# Patient Record
Sex: MALE | Race: WHITE | NOT HISPANIC OR LATINO | Employment: PART TIME | URBAN - NONMETROPOLITAN AREA
[De-identification: names, ages, dates, MRNs, and addresses within clinical notes are randomized per-mention and may not be internally consistent; named-entity substitution may affect disease eponyms.]

---

## 2021-10-24 ENCOUNTER — HOSPITAL ENCOUNTER (EMERGENCY)
Facility: OTHER | Age: 21
Discharge: LEFT BEFORE DISCHARGE | End: 2021-10-24
Attending: PHYSICIAN ASSISTANT | Admitting: PHYSICIAN ASSISTANT
Payer: COMMERCIAL

## 2021-10-24 VITALS
HEART RATE: 131 BPM | RESPIRATION RATE: 16 BRPM | OXYGEN SATURATION: 97 % | DIASTOLIC BLOOD PRESSURE: 87 MMHG | SYSTOLIC BLOOD PRESSURE: 139 MMHG | WEIGHT: 145 LBS | TEMPERATURE: 97.1 F

## 2021-10-24 DIAGNOSIS — R05.9 COUGH: ICD-10-CM

## 2021-10-24 DIAGNOSIS — R50.9 FEVER: ICD-10-CM

## 2021-10-24 DIAGNOSIS — J02.9 SORE THROAT: ICD-10-CM

## 2021-10-24 LAB
ANION GAP SERPL CALCULATED.3IONS-SCNC: 9 MMOL/L (ref 3–14)
BASOPHILS # BLD AUTO: 0 10E3/UL (ref 0–0.2)
BASOPHILS NFR BLD AUTO: 0 %
BUN SERPL-MCNC: 18 MG/DL (ref 7–25)
CALCIUM SERPL-MCNC: 10 MG/DL (ref 8.6–10.3)
CHLORIDE BLD-SCNC: 97 MMOL/L (ref 98–107)
CO2 SERPL-SCNC: 28 MMOL/L (ref 21–31)
CREAT SERPL-MCNC: 1.01 MG/DL (ref 0.7–1.3)
EOSINOPHIL # BLD AUTO: 0.1 10E3/UL (ref 0–0.7)
EOSINOPHIL NFR BLD AUTO: 0 %
ERYTHROCYTE [DISTWIDTH] IN BLOOD BY AUTOMATED COUNT: 12.4 % (ref 10–15)
FLUAV RNA SPEC QL NAA+PROBE: NEGATIVE
FLUBV RNA RESP QL NAA+PROBE: NEGATIVE
GFR SERPL CREATININE-BSD FRML MDRD: >90 ML/MIN/1.73M2
GLUCOSE BLD-MCNC: 100 MG/DL (ref 70–105)
GROUP A STREP BY PCR: NOT DETECTED
HCT VFR BLD AUTO: 43.7 % (ref 40–53)
HGB BLD-MCNC: 15.7 G/DL (ref 13.3–17.7)
HOLD SPECIMEN: NORMAL
IMM GRANULOCYTES # BLD: 0 10E3/UL
IMM GRANULOCYTES NFR BLD: 0 %
LYMPHOCYTES # BLD AUTO: 3.6 10E3/UL (ref 0.8–5.3)
LYMPHOCYTES NFR BLD AUTO: 27 %
MCH RBC QN AUTO: 33.3 PG (ref 26.5–33)
MCHC RBC AUTO-ENTMCNC: 35.9 G/DL (ref 31.5–36.5)
MCV RBC AUTO: 93 FL (ref 78–100)
MONOCYTES # BLD AUTO: 1.2 10E3/UL (ref 0–1.3)
MONOCYTES NFR BLD AUTO: 9 %
MONOCYTES NFR BLD AUTO: NEGATIVE %
NEUTROPHILS # BLD AUTO: 8.4 10E3/UL (ref 1.6–8.3)
NEUTROPHILS NFR BLD AUTO: 64 %
NRBC # BLD AUTO: 0 10E3/UL
NRBC BLD AUTO-RTO: 0 /100
PLATELET # BLD AUTO: 367 10E3/UL (ref 150–450)
POTASSIUM BLD-SCNC: 4.7 MMOL/L (ref 3.5–5.1)
RBC # BLD AUTO: 4.72 10E6/UL (ref 4.4–5.9)
RSV RNA SPEC NAA+PROBE: NEGATIVE
SARS-COV-2 RNA RESP QL NAA+PROBE: NEGATIVE
SODIUM SERPL-SCNC: 134 MMOL/L (ref 134–144)
WBC # BLD AUTO: 13.4 10E3/UL (ref 4–11)

## 2021-10-24 PROCEDURE — 86308 HETEROPHILE ANTIBODY SCREEN: CPT | Performed by: PHYSICIAN ASSISTANT

## 2021-10-24 PROCEDURE — 87651 STREP A DNA AMP PROBE: CPT | Performed by: PHYSICIAN ASSISTANT

## 2021-10-24 PROCEDURE — 87637 SARSCOV2&INF A&B&RSV AMP PRB: CPT | Performed by: PHYSICIAN ASSISTANT

## 2021-10-24 PROCEDURE — C9803 HOPD COVID-19 SPEC COLLECT: HCPCS | Performed by: PHYSICIAN ASSISTANT

## 2021-10-24 PROCEDURE — 99282 EMERGENCY DEPT VISIT SF MDM: CPT | Performed by: PHYSICIAN ASSISTANT

## 2021-10-24 PROCEDURE — 82310 ASSAY OF CALCIUM: CPT | Performed by: PHYSICIAN ASSISTANT

## 2021-10-24 PROCEDURE — 87651 STREP A DNA AMP PROBE: CPT | Performed by: STUDENT IN AN ORGANIZED HEALTH CARE EDUCATION/TRAINING PROGRAM

## 2021-10-24 PROCEDURE — 85025 COMPLETE CBC W/AUTO DIFF WBC: CPT | Performed by: PHYSICIAN ASSISTANT

## 2021-10-24 PROCEDURE — 87637 SARSCOV2&INF A&B&RSV AMP PRB: CPT | Performed by: STUDENT IN AN ORGANIZED HEALTH CARE EDUCATION/TRAINING PROGRAM

## 2021-10-24 PROCEDURE — 36415 COLL VENOUS BLD VENIPUNCTURE: CPT | Performed by: PHYSICIAN ASSISTANT

## 2021-10-24 PROCEDURE — 99283 EMERGENCY DEPT VISIT LOW MDM: CPT | Performed by: PHYSICIAN ASSISTANT

## 2021-10-24 NOTE — ED PROVIDER NOTES
History     Chief Complaint   Patient presents with     Pharyngitis     HPI  Jared Sanchez is a 21 year old male who presents to the ED for evaluation of pharyngitis.  He reports suffering from a sore throat as well as cough and chills over the last couple of days.  He is still able to eat and drink however it is slightly uncomfortable for him.  He is up-to-date on vaccinations other than Covid.  He denies chest pain, shortness of breath, abdominal pain, dysuria.    Allergies:  No Known Allergies    Problem List:    There are no problems to display for this patient.       Past Medical History:    History reviewed. No pertinent past medical history.    Past Surgical History:    History reviewed. No pertinent surgical history.    Family History:    History reviewed. No pertinent family history.    Social History:  Marital Status:  Single [1]  Social History     Tobacco Use     Smoking status: Current Every Day Smoker     Types: Vaping Device     Smokeless tobacco: Never Used   Substance Use Topics     Alcohol use: Yes     Comment: occasional     Drug use: Yes     Types: Marijuana     Comment: green card        Medications:    No current outpatient medications on file.        Review of Systems   Constitutional: Positive for fatigue. Negative for chills and fever.   HENT: Positive for sore throat. Negative for congestion.    Eyes: Negative for visual disturbance.   Respiratory: Positive for cough. Negative for chest tightness and shortness of breath.    Cardiovascular: Negative for chest pain.   Gastrointestinal: Negative for abdominal pain.   Genitourinary: Negative for hematuria.   Musculoskeletal: Negative for back pain.   Skin: Negative for rash and wound.   Neurological: Negative for syncope.   Hematological: Does not bruise/bleed easily.   Psychiatric/Behavioral: Negative for confusion.       Physical Exam   BP: 139/87  Pulse: (!) 131  Temp: 97.1  F (36.2  C)  Resp: 16  Weight: 65.8 kg (145 lb)  SpO2: 97  %      Physical Exam  Constitutional:       General: He is not in acute distress.     Appearance: He is well-developed. He is not diaphoretic.   HENT:      Head: Normocephalic and atraumatic.      Right Ear: Tympanic membrane normal.      Left Ear: Tympanic membrane normal.      Mouth/Throat:      Pharynx: Posterior oropharyngeal erythema present. No pharyngeal swelling, oropharyngeal exudate or uvula swelling.   Eyes:      General: No scleral icterus.     Conjunctiva/sclera: Conjunctivae normal.   Cardiovascular:      Rate and Rhythm: Normal rate and regular rhythm.   Pulmonary:      Effort: Pulmonary effort is normal.      Breath sounds: Normal breath sounds.   Abdominal:      Palpations: Abdomen is soft.      Tenderness: There is no abdominal tenderness.   Musculoskeletal:         General: No deformity.      Cervical back: Neck supple.   Lymphadenopathy:      Cervical: No cervical adenopathy.   Skin:     General: Skin is warm and dry.      Coloration: Skin is not jaundiced.      Findings: No rash.   Neurological:      Mental Status: He is alert and oriented to person, place, and time. Mental status is at baseline.   Psychiatric:         Mood and Affect: Mood normal.         Behavior: Behavior normal.         ED Course        Procedures              Critical Care time:  none               Results for orders placed or performed during the hospital encounter of 10/24/21 (from the past 24 hour(s))   Group A Streptococcus PCR Throat Swab    Specimen: Throat; Swab   Result Value Ref Range    Group A strep by PCR Not Detected Not Detected    Narrative    The Xpert Xpress Strep A test, performed on the [employer], is a rapid, qualitative in vitro diagnostic test for the detection of Streptococcus pyogenes (Group A ß-hemolytic Streptococcus, Strep A) in throat swab specimens from patients with signs and symptoms of pharyngitis. The Xpert Xpress Strep A test can be used as an aid in the diagnosis of  Group A Streptococcal pharyngitis. The assay is not intended to monitor treatment for Group A Streptococcus infections. The Xpert Xpress Strep A test utilizes an automated real-time polymerase chain reaction (PCR) to detect Streptococcus pyogenes DNA.   Symptomatic Influenza A/B & SARS-CoV2 (COVID-19) Virus PCR Multiplex Nasopharyngeal    Specimen: Nasopharyngeal; Swab   Result Value Ref Range    Influenza A target Negative Negative    Influenza B target Negative Negative    RSV target Negative Negative    SARS CoV2 PCR Negative Negative    Narrative    Testing was performed using the Xpert Xpress CoV2/Flu/RSV Assay on the [employer]. This test should be ordered for the detection of SARS-CoV-2 and influenza viruses in individuals who meet clinical and/or epidemiological criteria. Test performance is unknown in asymptomatic patients. This test is for in vitro diagnostic use under the FDA EUA for laboratories certified under CLIA to perform high or moderate complexity testing. This test has not been FDA cleared or approved. A negative result does not rule out the presence of PCR inhibitors in the specimen or target RNA in concentration below the limit of detection for the assay. If only one viral target is positive but coinfection with multiple targets is suspected, the sample should be re-tested with another FDA cleared, approved, or authorized test, if coinfection would change clinical management. This test was validated by the [facility] [employer]. These laboratories are certified under the Clinical  Laboratory Improvement Amendments of 1988 (CLIA-88) as qualified to perform high complexity laboratory testing.   CBC with platelets differential    Narrative    The following orders were created for panel order CBC with platelets differential.  Procedure                               Abnormality         Status                     ---------                               -----------          ------                     CBC with platelets and d...[[id number]]  Abnormal            Final result                 Please view results for these tests on the individual orders.   Basic metabolic panel   Result Value Ref Range    Sodium 134 134 - 144 mmol/L    Potassium 4.7 3.5 - 5.1 mmol/L    Chloride 97 (L) 98 - 107 mmol/L    Carbon Dioxide (CO2) 28 21 - 31 mmol/L    Anion Gap 9 3 - 14 mmol/L    Urea Nitrogen 18 7 - 25 mg/dL    Creatinine 1.01 0.70 - 1.30 mg/dL    Calcium 10.0 8.6 - 10.3 mg/dL    Glucose 100 70 - 105 mg/dL    GFR Estimate >90 >60 mL/min/1.73m2   Mononucleosis screen   Result Value Ref Range    Mononucleosis Screen Negative Negative   Extra Tube    Narrative    The following orders were created for panel order Extra Tube.  Procedure                               Abnormality         Status                     ---------                               -----------         ------                     Extra Blue Top Tube[[id number]]                              Final result               Extra Serum Separator Tu...[[id number]]                      Final result               Extra Green Top (Lithium...[[id number]]                      Final result                 Please view results for these tests on the individual orders.   Extra Blue Top Tube   Result Value Ref Range    Hold Specimen JIC    Extra Serum Separator Tube (SST)   Result Value Ref Range    Hold Specimen JIC    Extra Green Top (Lithium Heparin) ON ICE   Result Value Ref Range    Hold Specimen JIC    CBC with platelets and differential   Result Value Ref Range    WBC Count 13.4 (H) 4.0 - 11.0 10e3/uL    RBC Count 4.72 4.40 - 5.90 10e6/uL    Hemoglobin 15.7 13.3 - 17.7 g/dL    Hematocrit 43.7 40.0 - 53.0 %    MCV 93 78 - 100 fL    MCH 33.3 (H) 26.5 - 33.0 pg    MCHC 35.9 31.5 - 36.5 g/dL    RDW 12.4 10.0 - 15.0 %    Platelet Count 367 150 - 450 10e3/uL    % Neutrophils 64 %    % Lymphocytes 27 %    % Monocytes 9 %    % Eosinophils 0 %    % Basophils  0 %    % Immature Granulocytes 0 %    NRBCs per 100 WBC 0 <1 /100    Absolute Neutrophils 8.4 (H) 1.6 - 8.3 10e3/uL    Absolute Lymphocytes 3.6 0.8 - 5.3 10e3/uL    Absolute Monocytes 1.2 0.0 - 1.3 10e3/uL    Absolute Eosinophils 0.1 0.0 - 0.7 10e3/uL    Absolute Basophils 0.0 0.0 - 0.2 10e3/uL    Absolute Immature Granulocytes 0.0 <=0.0 10e3/uL    Absolute NRBCs 0.0 10e3/uL       Medications - No data to display    Assessments & Plan (with Medical Decision Making)   Pt nontoxic in NAD. Heart, lung, bowel sounds normal, abd soft, nontender to palpation, nondistended. VSS, afebrile.     He does have some erythema to his posterior oropharynx but no obvious excessive swelling or exudate.  There is no palpable cervical adenopathy.  He does have full range of motion of his neck however he says it hurts a little bit when he extends it.    My differential is broad but includes Covid, RSV, influenza, strep throat, mono, deep space soft tissue neck infection.    We did obtain lab work and I ordered chest x-ray and neck imaging.    While awaiting these results the patient said that he no longer wanted to stay in the emergency department and he left AGAINST MEDICAL ADVICE.  I was unable to speak to him again as I was busy in another room and he left abruptly but we did discuss my concern earlier for emergent processes occurring such as deep space neck soft tissue infections and clearly without obtaining further studies this impossible for me to say whether this is occurring.    I did review his lab work and he has slight leukocytosis-although this is very nonspecific at this point, negative for influenza, RSV and Covid, strep and mono.    I did still place referral for him to follow-up and establish care with PCP.    Daniel Elias PA-C        I have reviewed the nursing notes.    I have reviewed the findings, diagnosis, plan and need for follow up with the patient.       There are no discharge medications for this  patient.      Final diagnoses:   Sore throat       10/24/2021   [facility]     Daniel Elias PA  10/25/21 0002

## 2021-10-24 NOTE — ED NOTES
Pt put light on a stated he wanted to leave AMA, form reviewed with pt and acknowledged, pt denied wanting to speak with provider before leaving.

## 2021-10-24 NOTE — ED TRIAGE NOTES
ED Nursing Triage Note (General)   ________________________________    Jared Sanchez is a 21 year old Male that presents to triage private car  With history of sore throat for two days, cough, chills.   /87   Pulse (!) 131   Temp 97.1  F (36.2  C) (Tympanic)   Resp 16   Wt 65.8 kg (145 lb)   SpO2 97% t  Patient appears alert , in mild distress., and cooperative behavior.    GCS Total = 15  Airway: intact  Breathing noted as Normal  Circulation Normal  Skin:  Normal  Action taken:  Strep test and covid test completed

## 2021-10-25 ASSESSMENT — ENCOUNTER SYMPTOMS
BACK PAIN: 0
CONFUSION: 0
CHEST TIGHTNESS: 0
FATIGUE: 1
BRUISES/BLEEDS EASILY: 0
COUGH: 1
FEVER: 0
SHORTNESS OF BREATH: 0
ABDOMINAL PAIN: 0
HEMATURIA: 0
WOUND: 0
SORE THROAT: 1
CHILLS: 0

## 2023-11-07 ENCOUNTER — APPOINTMENT (OUTPATIENT)
Dept: CT IMAGING | Facility: OTHER | Age: 23
End: 2023-11-07
Attending: PHYSICIAN ASSISTANT
Payer: MEDICAID

## 2023-11-07 ENCOUNTER — HOSPITAL ENCOUNTER (EMERGENCY)
Facility: OTHER | Age: 23
Discharge: HOME | End: 2023-11-07
Attending: PHYSICIAN ASSISTANT | Admitting: PHYSICIAN ASSISTANT
Payer: MEDICAID

## 2023-11-07 VITALS
TEMPERATURE: 99.6 F | DIASTOLIC BLOOD PRESSURE: 87 MMHG | HEART RATE: 84 BPM | OXYGEN SATURATION: 98 % | SYSTOLIC BLOOD PRESSURE: 118 MMHG | WEIGHT: 130 LBS | HEIGHT: 67 IN | BODY MASS INDEX: 20.4 KG/M2 | RESPIRATION RATE: 10 BRPM

## 2023-11-07 DIAGNOSIS — F11.93 OPIATE WITHDRAWAL (H): ICD-10-CM

## 2023-11-07 DIAGNOSIS — F41.9 ANXIETY: ICD-10-CM

## 2023-11-07 LAB
ALBUMIN SERPL BCG-MCNC: 4.8 G/DL (ref 3.5–5.2)
ALBUMIN UR-MCNC: 30 MG/DL
ALP SERPL-CCNC: 87 U/L (ref 40–129)
ALT SERPL W P-5'-P-CCNC: 67 U/L (ref 0–70)
AMPHETAMINES UR QL SCN: ABNORMAL
ANION GAP SERPL CALCULATED.3IONS-SCNC: 15 MMOL/L (ref 7–15)
APPEARANCE UR: CLEAR
AST SERPL W P-5'-P-CCNC: 28 U/L (ref 0–45)
BARBITURATES UR QL SCN: ABNORMAL
BASOPHILS # BLD AUTO: 0 10E3/UL (ref 0–0.2)
BASOPHILS NFR BLD AUTO: 1 %
BENZODIAZ UR QL SCN: ABNORMAL
BILIRUB SERPL-MCNC: 0.6 MG/DL
BILIRUB UR QL STRIP: NEGATIVE
BUN SERPL-MCNC: 24.5 MG/DL (ref 6–20)
BZE UR QL SCN: ABNORMAL
CALCIUM SERPL-MCNC: 9.6 MG/DL (ref 8.6–10)
CANNABINOIDS UR QL SCN: ABNORMAL
CHLORIDE SERPL-SCNC: 104 MMOL/L (ref 98–107)
COLOR UR AUTO: YELLOW
CREAT SERPL-MCNC: 0.85 MG/DL (ref 0.67–1.17)
D DIMER PPP FEU-MCNC: 0.77 UG/ML FEU (ref 0–0.5)
DEPRECATED HCO3 PLAS-SCNC: 22 MMOL/L (ref 22–29)
EGFRCR SERPLBLD CKD-EPI 2021: >90 ML/MIN/1.73M2
EOSINOPHIL # BLD AUTO: 0 10E3/UL (ref 0–0.7)
EOSINOPHIL NFR BLD AUTO: 0 %
ERYTHROCYTE [DISTWIDTH] IN BLOOD BY AUTOMATED COUNT: 12.6 % (ref 10–15)
ETHANOL SERPL-MCNC: <0.01 G/DL
FENTANYL UR QL: ABNORMAL
GLUCOSE SERPL-MCNC: 99 MG/DL (ref 70–99)
GLUCOSE UR STRIP-MCNC: NEGATIVE MG/DL
HCT VFR BLD AUTO: 46.9 % (ref 40–53)
HGB BLD-MCNC: 16.5 G/DL (ref 13.3–17.7)
HGB UR QL STRIP: NEGATIVE
HOLD SPECIMEN: NORMAL
IMM GRANULOCYTES # BLD: 0.1 10E3/UL
IMM GRANULOCYTES NFR BLD: 1 %
KETONES UR STRIP-MCNC: 80 MG/DL
LACTATE SERPL-SCNC: 1.3 MMOL/L (ref 0.7–2)
LEUKOCYTE ESTERASE UR QL STRIP: NEGATIVE
LIPASE SERPL-CCNC: 28 U/L (ref 13–60)
LYMPHOCYTES # BLD AUTO: 1.9 10E3/UL (ref 0.8–5.3)
LYMPHOCYTES NFR BLD AUTO: 23 %
MAGNESIUM SERPL-MCNC: 2.4 MG/DL (ref 1.7–2.3)
MCH RBC QN AUTO: 32.4 PG (ref 26.5–33)
MCHC RBC AUTO-ENTMCNC: 35.2 G/DL (ref 31.5–36.5)
MCV RBC AUTO: 92 FL (ref 78–100)
MONOCYTES # BLD AUTO: 0.7 10E3/UL (ref 0–1.3)
MONOCYTES NFR BLD AUTO: 9 %
MUCOUS THREADS #/AREA URNS LPF: PRESENT /LPF
NEUTROPHILS # BLD AUTO: 5.4 10E3/UL (ref 1.6–8.3)
NEUTROPHILS NFR BLD AUTO: 66 %
NITRATE UR QL: NEGATIVE
NRBC # BLD AUTO: 0 10E3/UL
NRBC BLD AUTO-RTO: 0 /100
OPIATES UR QL SCN: ABNORMAL
PCP QUAL URINE (ROCHE): ABNORMAL
PH UR STRIP: 5.5 [PH] (ref 5–9)
PLATELET # BLD AUTO: 381 10E3/UL (ref 150–450)
POTASSIUM SERPL-SCNC: 4.4 MMOL/L (ref 3.4–5.3)
PROT SERPL-MCNC: 8.2 G/DL (ref 6.4–8.3)
RBC # BLD AUTO: 5.1 10E6/UL (ref 4.4–5.9)
RBC URINE: 1 /HPF
SODIUM SERPL-SCNC: 141 MMOL/L (ref 135–145)
SP GR UR STRIP: 1.04 (ref 1–1.03)
TSH SERPL DL<=0.005 MIU/L-ACNC: 0.51 UIU/ML (ref 0.3–4.2)
UROBILINOGEN UR STRIP-MCNC: 2 MG/DL
WBC # BLD AUTO: 8.2 10E3/UL (ref 4–11)
WBC URINE: 4 /HPF

## 2023-11-07 PROCEDURE — 99285 EMERGENCY DEPT VISIT HI MDM: CPT | Mod: 25 | Performed by: PHYSICIAN ASSISTANT

## 2023-11-07 PROCEDURE — 82040 ASSAY OF SERUM ALBUMIN: CPT | Performed by: PHYSICIAN ASSISTANT

## 2023-11-07 PROCEDURE — 99284 EMERGENCY DEPT VISIT MOD MDM: CPT | Performed by: PHYSICIAN ASSISTANT

## 2023-11-07 PROCEDURE — 83690 ASSAY OF LIPASE: CPT | Performed by: PHYSICIAN ASSISTANT

## 2023-11-07 PROCEDURE — 93005 ELECTROCARDIOGRAM TRACING: CPT | Performed by: PHYSICIAN ASSISTANT

## 2023-11-07 PROCEDURE — 82077 ASSAY SPEC XCP UR&BREATH IA: CPT | Performed by: PHYSICIAN ASSISTANT

## 2023-11-07 PROCEDURE — 250N000011 HC RX IP 250 OP 636: Performed by: PHYSICIAN ASSISTANT

## 2023-11-07 PROCEDURE — 250N000013 HC RX MED GY IP 250 OP 250 PS 637: Performed by: PHYSICIAN ASSISTANT

## 2023-11-07 PROCEDURE — 81001 URINALYSIS AUTO W/SCOPE: CPT | Performed by: PHYSICIAN ASSISTANT

## 2023-11-07 PROCEDURE — 85379 FIBRIN DEGRADATION QUANT: CPT | Performed by: PHYSICIAN ASSISTANT

## 2023-11-07 PROCEDURE — 96374 THER/PROPH/DIAG INJ IV PUSH: CPT | Mod: XU | Performed by: PHYSICIAN ASSISTANT

## 2023-11-07 PROCEDURE — 36415 COLL VENOUS BLD VENIPUNCTURE: CPT | Performed by: PHYSICIAN ASSISTANT

## 2023-11-07 PROCEDURE — 258N000003 HC RX IP 258 OP 636: Performed by: PHYSICIAN ASSISTANT

## 2023-11-07 PROCEDURE — 96361 HYDRATE IV INFUSION ADD-ON: CPT | Performed by: PHYSICIAN ASSISTANT

## 2023-11-07 PROCEDURE — 83605 ASSAY OF LACTIC ACID: CPT | Performed by: PHYSICIAN ASSISTANT

## 2023-11-07 PROCEDURE — 83735 ASSAY OF MAGNESIUM: CPT | Performed by: PHYSICIAN ASSISTANT

## 2023-11-07 PROCEDURE — 85025 COMPLETE CBC W/AUTO DIFF WBC: CPT | Performed by: PHYSICIAN ASSISTANT

## 2023-11-07 PROCEDURE — 93010 ELECTROCARDIOGRAM REPORT: CPT | Performed by: INTERNAL MEDICINE

## 2023-11-07 PROCEDURE — 71275 CT ANGIOGRAPHY CHEST: CPT

## 2023-11-07 PROCEDURE — 80307 DRUG TEST PRSMV CHEM ANLYZR: CPT | Performed by: PHYSICIAN ASSISTANT

## 2023-11-07 PROCEDURE — 84443 ASSAY THYROID STIM HORMONE: CPT | Performed by: PHYSICIAN ASSISTANT

## 2023-11-07 RX ORDER — DIAZEPAM 10 MG/2ML
5 INJECTION, SOLUTION INTRAMUSCULAR; INTRAVENOUS ONCE
Status: COMPLETED | OUTPATIENT
Start: 2023-11-07 | End: 2023-11-07

## 2023-11-07 RX ORDER — CLONIDINE HYDROCHLORIDE 0.1 MG/1
0.1 TABLET ORAL 2 TIMES DAILY
Qty: 28 TABLET | Refills: 0 | Status: SHIPPED | OUTPATIENT
Start: 2023-11-07 | End: 2023-12-28

## 2023-11-07 RX ORDER — DIAZEPAM 5 MG
5 TABLET ORAL EVERY 6 HOURS PRN
Qty: 20 TABLET | Refills: 0 | Status: SHIPPED | OUTPATIENT
Start: 2023-11-07 | End: 2023-11-22

## 2023-11-07 RX ORDER — DIAZEPAM 5 MG
5 TABLET ORAL ONCE
Status: COMPLETED | OUTPATIENT
Start: 2023-11-07 | End: 2023-11-07

## 2023-11-07 RX ORDER — IOPAMIDOL 755 MG/ML
66 INJECTION, SOLUTION INTRAVASCULAR ONCE
Status: COMPLETED | OUTPATIENT
Start: 2023-11-07 | End: 2023-11-07

## 2023-11-07 RX ADMIN — SODIUM CHLORIDE 1000 ML: 9 INJECTION, SOLUTION INTRAVENOUS at 17:20

## 2023-11-07 RX ADMIN — IOPAMIDOL 66 ML: 755 INJECTION, SOLUTION INTRAVENOUS at 18:18

## 2023-11-07 RX ADMIN — DIAZEPAM 5 MG: 5 TABLET ORAL at 19:43

## 2023-11-07 RX ADMIN — DIAZEPAM 5 MG: 5 INJECTION INTRAMUSCULAR; INTRAVENOUS at 17:19

## 2023-11-07 ASSESSMENT — ACTIVITIES OF DAILY LIVING (ADL)
ADLS_ACUITY_SCORE: 35
ADLS_ACUITY_SCORE: 33

## 2023-11-07 NOTE — ED TRIAGE NOTES
Pt presents with c/o visual hallucinations. Mom present and reports illogical speech. Pt tremulous, and anxious at this time. Reports to last using fentanyl last 4 days ago.    Antonia Roberts RN on 11/7/2023 at 3:48 PM       Triage Assessment (Adult)       Row Name 11/07/23 1545          Triage Assessment    Airway WDL WDL        Respiratory WDL    Respiratory WDL WDL        Cognitive/Neuro/Behavioral WDL    Cognitive/Neuro/Behavioral WDL X;all     Mood/Behavior anxious        Motor Response    Left Motor Response tremors     Right Motor Response tremors

## 2023-11-08 NOTE — DISCHARGE INSTRUCTIONS
-Recommend close follow-up with a mental health specialist for further evaluation for anxiety and opiate use disorder  -Would recommend detox and at least outpatient treatment for opiate use disorder.  Inpatient may also be beneficial  -Use Valium 5 mg up to every 6 hours as needed for withdrawal symptoms.  Opiate withdrawal symptoms may take a few weeks to completely resolve.  Also use clonidine 0.1 mg every 12 hours for withdrawal symptoms for high blood pressure, heart palpitations.  Use melatonin to help with sleep  -Also recommend close follow-up with primary care doctor for medication management.  -Return to the ER for any worsening of symptoms

## 2023-11-08 NOTE — ED PROVIDER NOTES
"      EMERGENCY DEPARTMENT ENCOUNTER      NAME: Jared Sanchez  AGE: 23 year old male  YOB: 2000  MRN: [id number]  EVALUATION DATE & TIME: 11/7/2023  5:03 PM    PCP: No Ref-Primary, Physician    ED PROVIDER: Kade Tay PA-C       CHIEF COMPLAINT:  Chief Complaint   Patient presents with    Withdrawal    Hallucinations       ED COURSE, MEDICAL DECISION MAKING, FINAL IMPRESSION AND PLAN:     The patient was interviewed and examined.  HPI and physical exam as below.  Differential diagnosis and MDM Key Documentation Elements as below.  Vitals and triage note were reviewed.  /87   Pulse 84   Temp 99.6  F (37.6  C) (Tympanic)   Resp 10   Ht 1.702 m (5' 7\")   Wt 59 kg (130 lb)   SpO2 98%   BMI 20.36 kg/m      Differential includes but is not limited to opiate use withdrawal, electrolyte abnormalities, anxiety    Patient was afebrile, elevated heart rate 116, otherwise normal vitals.  Patient was in no acute distress but did appear to be anxious appearing.  Patient slightly tremulous.  [place] studies show dehydration with urine specific gravity 1.038 without signs of UA or UTI.  UDS positive for fentanyl and cannabinoids.  No leukocytosis or anemia.  Normal hepatic function.  Slightly elevation in BUN, most likely secondary to dehydration otherwise normal renal function.  Magnesium slightly elevated 2.4 otherwise normal electrolytes.  Negative alcohol.  Normal TSH is 0.51.  Lactic acid normal.  D-dimer elevated 0.77.  CT PE study was chest was ordered given elevated D-dimer otherwise negative for acute PE or any other acute pathology.    Patient was given IV fluids for tachycardia and Valium for withdrawal symptoms.  Tachycardia resolved.  Tremors improved with use of Valium.    Examination today seems most consistent with opiate withdrawal.  For withdrawal symptoms, will prescribe patient oral Valium, oral clonidine, and melatonin for sleep.  Referral for mental health was also placed.  " Patient did not want to go to detox but was encouraged to either go to detox at a later point in time and consider outpatient/inpatient treatment.  Turn to the ER for any worsening symptoms    Assessment / Plan:  1. Opiate withdrawal (H)    2. Anxiety          Medications given during today's ER visit:  Medications   sodium chloride 0.9% BOLUS 1,000 mL (0 mLs Intravenous Stopped 11/7/23 1907)   diazepam (VALIUM) injection 5 mg (5 mg Intravenous $Given 11/7/23 1719)   iopamidol (ISOVUE-370) solution 66 mL (66 mLs Intravenous $Given 11/7/23 1818)   diazepam (VALIUM) tablet 5 mg (5 mg Oral $Given 11/7/23 1943)       New prescriptions started at today's ER visit  Discharge Medication List as of 11/7/2023  7:47 PM        START taking these medications    Details   cloNIDine (CATAPRES) 0.1 MG tablet Take 1 tablet (0.1 mg) by mouth 2 times daily, Disp-28 tablet, R-0, E-Prescribe      diazepam (VALIUM) 5 MG tablet Take 1 tablet (5 mg) by mouth every 6 hours as needed for anxiety or sleep, Disp-20 tablet, R-0, E-Prescribe      melatonin 5 MG CAPS Take 1 capsule by mouth at bedtime for 10 days For sleep, Disp-30 capsule, R-0, E-Prescribe               Pertinent Labs & Imaging studies reviewed. (See chart for details)  Results for orders placed or performed during the hospital encounter of 11/07/23   CT Chest Pulmonary Embolism w Contrast    Impression    IMPRESSION:    No acute pulmonary emboli to the subsegmental level.    ASHWIN COUCH MD         SYSTEM ID:  [id number]   Comprehensive metabolic panel   Result Value Ref Range    Sodium 141 135 - 145 mmol/L    Potassium 4.4 3.4 - 5.3 mmol/L    Carbon Dioxide (CO2) 22 22 - 29 mmol/L    Anion Gap 15 7 - 15 mmol/L    Urea Nitrogen 24.5 (H) 6.0 - 20.0 mg/dL    Creatinine 0.85 0.67 - 1.17 mg/dL    GFR Estimate >90 >60 mL/min/1.73m2    Calcium 9.6 8.6 - 10.0 mg/dL    Chloride 104 98 - 107 mmol/L    Glucose 99 70 - 99 mg/dL    Alkaline Phosphatase 87 40 - 129 U/L    AST 28 0 -  45 U/L    ALT 67 0 - 70 U/L    Protein Total 8.2 6.4 - 8.3 g/dL    Albumin 4.8 3.5 - 5.2 g/dL    Bilirubin Total 0.6 <=1.2 mg/dL   Result Value Ref Range    Lipase 28 13 - 60 U/L   Lactic acid whole blood   Result Value Ref Range    Lactic Acid 1.3 0.7 - 2.0 mmol/L   D dimer quantitative   Result Value Ref Range    D-Dimer Quantitative 0.77 (H) 0.00 - 0.50 ug/mL FEU   Result Value Ref Range    Magnesium 2.4 (H) 1.7 - 2.3 mg/dL   TSH Reflex GH   Result Value Ref Range    TSH 0.51 0.30 - 4.20 uIU/mL   Ethanol GH   Result Value Ref Range    Alcohol ethyl <0.01 <=0.01 g/dL   UA with Microscopic reflex to Culture    Specimen: Urine, Midstream   Result Value Ref Range    Color Urine Yellow Colorless, Straw, Light Yellow, Yellow    Appearance Urine Clear Clear    Glucose Urine Negative Negative mg/dL    Bilirubin Urine Negative Negative    Ketones Urine 80 (A) Negative mg/dL    Specific Gravity Urine 1.038 (H) 1.000 - 1.030    Blood Urine Negative Negative    pH Urine 5.5 5.0 - 9.0    Protein Albumin Urine 30 (A) Negative mg/dL    Urobilinogen Urine 2.0 Normal, 2.0 mg/dL    Nitrite Urine Negative Negative    Leukocyte Esterase Urine Negative Negative    Mucus Urine Present (A) None Seen /LPF    RBC Urine 1 <=2 /HPF    WBC Urine 4 <=5 /HPF   CBC with platelets and differential   Result Value Ref Range    WBC Count 8.2 4.0 - 11.0 10e3/uL    RBC Count 5.10 4.40 - 5.90 10e6/uL    Hemoglobin 16.5 13.3 - 17.7 g/dL    Hematocrit 46.9 40.0 - 53.0 %    MCV 92 78 - 100 fL    MCH 32.4 26.5 - 33.0 pg    MCHC 35.2 31.5 - 36.5 g/dL    RDW 12.6 10.0 - 15.0 %    Platelet Count 381 150 - 450 10e3/uL    % Neutrophils 66 %    % Lymphocytes 23 %    % Monocytes 9 %    % Eosinophils 0 %    % Basophils 1 %    % Immature Granulocytes 1 %    NRBCs per 100 WBC 0 <1 /100    Absolute Neutrophils 5.4 1.6 - 8.3 10e3/uL    Absolute Lymphocytes 1.9 0.8 - 5.3 10e3/uL    Absolute Monocytes 0.7 0.0 - 1.3 10e3/uL    Absolute Eosinophils 0.0 0.0 - 0.7 10e3/uL  "   Absolute Basophils 0.0 0.0 - 0.2 10e3/uL    Absolute Immature Granulocytes 0.1 <=0.4 10e3/uL    Absolute NRBCs 0.0 10e3/uL   Urine Drug Screen Panel   Result Value Ref Range    Amphetamines Urine Screen Negative Screen Negative    Barbituates Urine Screen Negative Screen Negative    Benzodiazepine Urine Screen Negative Screen Negative    Cannabinoids Urine Screen Positive (A) Screen Negative    Cocaine Urine Screen Negative Screen Negative    Fentanyl Qual Urine Screen Positive (A) Screen Negative    Opiates Urine Screen Negative Screen Negative    PCP Urine Screen Negative Screen Negative   Extra Red Top Tube   Result Value Ref Range    Hold Specimen JIC    EKG 12-lead, tracing only   Result Value Ref Range    Systolic Blood Pressure  mmHg    Diastolic Blood Pressure  mmHg    Ventricular Rate 85 BPM    Atrial Rate 85 BPM    CO Interval 130 ms    QRS Duration 82 ms     ms    QTc 418 ms    P Axis 73 degrees    R AXIS 83 degrees    T Axis 73 degrees    Interpretation ECG       Sinus rhythm with sinus arrhythmia  Biatrial enlargement  Abnormal ECG  No previous ECGs available  Confirmed by MD ARVIND, MICHAEL ([id number]) on 11/9/2023 5:02:53 PM       No results found for: \"ABORH\"      Reassessments, Medications, Interventions, & Response to Treatments:  Improved with interventions as above    Consultations:  None    Decision Rules, Medical Calculators, and Risk Stratification Tools:  None    MDM Key Documentation Elements for Patient's Evaluation:  Differential diagnosis to include high risk considerations: As above  Escalation to admission/observation considered: Admission/observation considered, but patient does not meet admission criteria  Discussions and management with other clinicians:    3a. Independent interpretation of testing performed by another health professional:  -No  3b. Discussion of management or test interpretation with another health professional: -No  Independent interpretation of tests:  " Ordering and/or review of 3+ test(s)  Discussion of test interpretations with radiology:  No  History obtained from source other than patient or assessment requiring an independent historian:  No  Review of non-ED/external records:  review of 1 records  Diagnostic tests considered but not ultimately performed/deferred:  -Chest x-ray, went with CTA chest PE study  Prescription medications considered but not prescribed:  -Ativan  Chronic conditions affecting care:  -Opiate use disorder, anxiety  Care affected by social determinants of health:  -None    The patient's management involved:   - Laboratory studies  - Imaging studies  - Parenteral controlled substance  - Prescription drug management      A shared decision making model was used. Time was taken to answer all questions.  Patient and/or associated parties understood and were agreeable to treatment plan.  Plan and all results were discussed. Warning signs and close return precautions to return to the ED given. Copy of results given. Discharged in stable condition. Discharged with discharge instructions outlining plan for further care and follow up.      PPE worn during patient evaluation:  Mask: Yes  Eye Protection: No  Gown: No  Hair cover: No  Face Shield: No  Patient wearing a mask: No    =================================================================    HPI  Jared Sanchez is a pleasant 23 year old male with history of opiate use disorder who presents to the ER with his wife for opiate withdrawal symptoms.  Patient mother states that he has been having visual and auditory hallucinations recently.  Last fentanyl use was 2 days ago per patient.  Patient now having heart palpitations, being diaphoretic, slightly short of breath, hard time focusing and sleeping.  No fever or chills.  No vomiting or diarrhea.  Denies any other illicit drug use.  Does not have a primary care doctor.  Patient does not want to go to detox.      REVIEW OF SYSTEMS   Review of  "Systems  As above, otherwise ROS is unremarkable.      PAST MEDICAL HISTORY:  History reviewed. No pertinent past medical history.    PAST SURGICAL HISTORY:  No past surgical history on file.    CURRENT MEDICATIONS:    Current Outpatient Medications   Medication Instructions    cloNIDine (CATAPRES) 0.1 mg, Oral, 2 TIMES DAILY    diazepam (VALIUM) 5 mg, Oral, EVERY 6 HOURS PRN    melatonin 5 MG CAPS 1 capsule, Oral, AT BEDTIME, For sleep       ALLERGIES:  No Known Allergies    FAMILY HISTORY:  No family history on file.    SOCIAL HISTORY:   Social History     Socioeconomic History    Marital status: Single     Spouse name: None    Number of children: None    Years of education: None    Highest education level: None   Tobacco Use    Smoking status: Every Day     Types: Vaping Device    Smokeless tobacco: Never   Substance and Sexual Activity    Alcohol use: Yes     Comment: occasional    Drug use: Yes     Types: Marijuana     Comment: green card       PHYSICAL EXAM    VITAL SIGNS: /87   Pulse 84   Temp 99.6  F (37.6  C) (Tympanic)   Resp 10   Ht 1.702 m (5' 7\")   Wt 59 kg (130 lb)   SpO2 98%   BMI 20.36 kg/m      No data found.      Physical Exam  Vitals and nursing note reviewed.   Constitutional:       General: He is not in acute distress.     Appearance: Normal appearance. He is not ill-appearing or diaphoretic.      Comments: Patient was resting comfortably on exam bed.  No tremor.  No diaphoresis.   HENT:      Nose: Nose normal.      Mouth/Throat:      Mouth: Mucous membranes are moist.      Pharynx: Oropharynx is clear.   Eyes:      Conjunctiva/sclera: Conjunctivae normal.   Cardiovascular:      Rate and Rhythm: Normal rate and regular rhythm.      Pulses: Normal pulses.      Heart sounds: Normal heart sounds. No murmur heard.     No friction rub. No gallop.   Pulmonary:      Effort: Pulmonary effort is normal.      Breath sounds: Normal breath sounds. No wheezing, rhonchi or rales.   Abdominal:     "  General: Abdomen is flat. Bowel sounds are normal.      Palpations: Abdomen is soft.      Tenderness: There is no abdominal tenderness.   Musculoskeletal:         General: Normal range of motion.      Cervical back: Normal range of motion and neck supple.      Right lower leg: No edema.      Left lower leg: No edema.   Skin:     General: Skin is warm and dry.      Capillary Refill: Capillary refill takes less than 2 seconds.      Coloration: Skin is not jaundiced.   Neurological:      General: No focal deficit present.      Mental Status: He is alert.      Comments: No tremor              LABS & RADIOLOGY:  All pertinent labs reviewed and interpreted. Reviewed all pertinent imaging. Please see official radiology report.  Results for orders placed or performed during the hospital encounter of 11/07/23   CT Chest Pulmonary Embolism w Contrast    Impression    IMPRESSION:    No acute pulmonary emboli to the subsegmental level.    ASHWIN COUCH MD         SYSTEM ID:  [id number]   Comprehensive metabolic panel   Result Value Ref Range    Sodium 141 135 - 145 mmol/L    Potassium 4.4 3.4 - 5.3 mmol/L    Carbon Dioxide (CO2) 22 22 - 29 mmol/L    Anion Gap 15 7 - 15 mmol/L    Urea Nitrogen 24.5 (H) 6.0 - 20.0 mg/dL    Creatinine 0.85 0.67 - 1.17 mg/dL    GFR Estimate >90 >60 mL/min/1.73m2    Calcium 9.6 8.6 - 10.0 mg/dL    Chloride 104 98 - 107 mmol/L    Glucose 99 70 - 99 mg/dL    Alkaline Phosphatase 87 40 - 129 U/L    AST 28 0 - 45 U/L    ALT 67 0 - 70 U/L    Protein Total 8.2 6.4 - 8.3 g/dL    Albumin 4.8 3.5 - 5.2 g/dL    Bilirubin Total 0.6 <=1.2 mg/dL   Result Value Ref Range    Lipase 28 13 - 60 U/L   Lactic acid whole blood   Result Value Ref Range    Lactic Acid 1.3 0.7 - 2.0 mmol/L   D dimer quantitative   Result Value Ref Range    D-Dimer Quantitative 0.77 (H) 0.00 - 0.50 ug/mL FEU   Result Value Ref Range    Magnesium 2.4 (H) 1.7 - 2.3 mg/dL   TSH Reflex GH   Result Value Ref Range    TSH 0.51 0.30 - 4.20  uIU/mL   Ethanol GH   Result Value Ref Range    Alcohol ethyl <0.01 <=0.01 g/dL   UA with Microscopic reflex to Culture    Specimen: Urine, Midstream   Result Value Ref Range    Color Urine Yellow Colorless, Straw, Light Yellow, Yellow    Appearance Urine Clear Clear    Glucose Urine Negative Negative mg/dL    Bilirubin Urine Negative Negative    Ketones Urine 80 (A) Negative mg/dL    Specific Gravity Urine 1.038 (H) 1.000 - 1.030    Blood Urine Negative Negative    pH Urine 5.5 5.0 - 9.0    Protein Albumin Urine 30 (A) Negative mg/dL    Urobilinogen Urine 2.0 Normal, 2.0 mg/dL    Nitrite Urine Negative Negative    Leukocyte Esterase Urine Negative Negative    Mucus Urine Present (A) None Seen /LPF    RBC Urine 1 <=2 /HPF    WBC Urine 4 <=5 /HPF   CBC with platelets and differential   Result Value Ref Range    WBC Count 8.2 4.0 - 11.0 10e3/uL    RBC Count 5.10 4.40 - 5.90 10e6/uL    Hemoglobin 16.5 13.3 - 17.7 g/dL    Hematocrit 46.9 40.0 - 53.0 %    MCV 92 78 - 100 fL    MCH 32.4 26.5 - 33.0 pg    MCHC 35.2 31.5 - 36.5 g/dL    RDW 12.6 10.0 - 15.0 %    Platelet Count 381 150 - 450 10e3/uL    % Neutrophils 66 %    % Lymphocytes 23 %    % Monocytes 9 %    % Eosinophils 0 %    % Basophils 1 %    % Immature Granulocytes 1 %    NRBCs per 100 WBC 0 <1 /100    Absolute Neutrophils 5.4 1.6 - 8.3 10e3/uL    Absolute Lymphocytes 1.9 0.8 - 5.3 10e3/uL    Absolute Monocytes 0.7 0.0 - 1.3 10e3/uL    Absolute Eosinophils 0.0 0.0 - 0.7 10e3/uL    Absolute Basophils 0.0 0.0 - 0.2 10e3/uL    Absolute Immature Granulocytes 0.1 <=0.4 10e3/uL    Absolute NRBCs 0.0 10e3/uL   Urine Drug Screen Panel   Result Value Ref Range    Amphetamines Urine Screen Negative Screen Negative    Barbituates Urine Screen Negative Screen Negative    Benzodiazepine Urine Screen Negative Screen Negative    Cannabinoids Urine Screen Positive (A) Screen Negative    Cocaine Urine Screen Negative Screen Negative    Fentanyl Qual Urine Screen Positive (A)  Screen Negative    Opiates Urine Screen Negative Screen Negative    PCP Urine Screen Negative Screen Negative   Extra Red Top Tube   Result Value Ref Range    Hold Specimen JIC    EKG 12-lead, tracing only   Result Value Ref Range    Systolic Blood Pressure  mmHg    Diastolic Blood Pressure  mmHg    Ventricular Rate 85 BPM    Atrial Rate 85 BPM    MT Interval 130 ms    QRS Duration 82 ms     ms    QTc 418 ms    P Axis 73 degrees    R AXIS 83 degrees    T Axis 73 degrees    Interpretation ECG       Sinus rhythm with sinus arrhythmia  Biatrial enlargement  Abnormal ECG  No previous ECGs available  Confirmed by MD ARVIND, [facility] ([id number]) on 11/9/2023 5:02:53 PM       CT Chest Pulmonary Embolism w Contrast   Final Result   IMPRESSION:      No acute pulmonary emboli to the subsegmental level.      KADE COUCH MD            SYSTEM ID:  [id number]            EKG:    No prior EKG available for comparison. EKG reviewed at 1725.  1) Rhythm: NSR  2) Rate: ventricular rate 85 bpm.  3) QRS Axis: No axis deviation  4) P waves/ MT interval: Sinus. Nml MINERVA. No atrial enlargement  5) QRS complex: Narrow. No BBB. No ventricular hypertrophy. No pathological Q waves.  6) ST Segment: No acute ST segment elevation or depression  7) T waves: No T wave inversions. No peaked or flattened T waves.     I have independently reviewed and interpreted today's EKG, pending cardiologist over read.           I, Giovanni Tay PA-C, personally performed the services described in this documentation, and it is both accurate and complete.       Kade Tay PA-C  11/12/23 [id number]

## 2023-11-09 LAB
ATRIAL RATE - MUSE: 85 BPM
DIASTOLIC BLOOD PRESSURE - MUSE: NORMAL MMHG
INTERPRETATION ECG - MUSE: NORMAL
P AXIS - MUSE: 73 DEGREES
PR INTERVAL - MUSE: 130 MS
QRS DURATION - MUSE: 82 MS
QT - MUSE: 352 MS
QTC - MUSE: 418 MS
R AXIS - MUSE: 83 DEGREES
SYSTOLIC BLOOD PRESSURE - MUSE: NORMAL MMHG
T AXIS - MUSE: 73 DEGREES
VENTRICULAR RATE- MUSE: 85 BPM

## 2023-11-21 RX ORDER — DIAZEPAM 5 MG
5 TABLET ORAL EVERY 6 HOURS PRN
Qty: 20 TABLET | Refills: 0 | OUTPATIENT
Start: 2023-11-21

## 2023-11-22 ENCOUNTER — TELEPHONE (OUTPATIENT)
Dept: FAMILY MEDICINE | Facility: OTHER | Age: 23
End: 2023-11-22

## 2023-11-22 ENCOUNTER — OFFICE VISIT (OUTPATIENT)
Dept: FAMILY MEDICINE | Facility: OTHER | Age: 23
End: 2023-11-22
Attending: FAMILY MEDICINE
Payer: MEDICAID

## 2023-11-22 VITALS
RESPIRATION RATE: 18 BRPM | HEIGHT: 67 IN | HEART RATE: 88 BPM | TEMPERATURE: 97.7 F | SYSTOLIC BLOOD PRESSURE: 110 MMHG | OXYGEN SATURATION: 98 % | WEIGHT: 159.5 LBS | DIASTOLIC BLOOD PRESSURE: 64 MMHG | BODY MASS INDEX: 25.03 KG/M2

## 2023-11-22 DIAGNOSIS — F11.10 OPIATE ABUSE, EPISODIC (H): Primary | ICD-10-CM

## 2023-11-22 LAB
AMPHETAMINES UR QL SCN: ABNORMAL
BARBITURATES UR QL SCN: ABNORMAL
BENZODIAZ UR QL SCN: ABNORMAL
BUPRENORPHINE UR QL: ABNORMAL
BZE UR QL SCN: ABNORMAL
CANNABINOIDS UR QL SCN: ABNORMAL
FENTANYL UR QL: ABNORMAL
OPIATES UR QL SCN: ABNORMAL
PCP QUAL URINE (ROCHE): ABNORMAL

## 2023-11-22 PROCEDURE — 99213 OFFICE O/P EST LOW 20 MIN: CPT | Performed by: FAMILY MEDICINE

## 2023-11-22 PROCEDURE — 80307 DRUG TEST PRSMV CHEM ANLYZR: CPT | Mod: ZL | Performed by: FAMILY MEDICINE

## 2023-11-22 RX ORDER — TRAZODONE HYDROCHLORIDE 100 MG/1
100 TABLET ORAL AT BEDTIME
Qty: 90 TABLET | Refills: 1 | Status: SHIPPED | OUTPATIENT
Start: 2023-11-22 | End: 2023-11-29

## 2023-11-22 RX ORDER — BUPRENORPHINE AND NALOXONE 8; 2 MG/1; MG/1
FILM, SOLUBLE BUCCAL; SUBLINGUAL
Status: CANCELLED | COMMUNITY
Start: 2023-11-22

## 2023-11-22 RX ORDER — BUPRENORPHINE AND NALOXONE 8; 2 MG/1; MG/1
1 FILM, SOLUBLE BUCCAL; SUBLINGUAL DAILY
Qty: 60 FILM | Refills: 0 | Status: SHIPPED | OUTPATIENT
Start: 2023-11-22 | End: 2023-11-22

## 2023-11-22 RX ORDER — BUPRENORPHINE AND NALOXONE 8; 2 MG/1; MG/1
1 FILM, SOLUBLE BUCCAL; SUBLINGUAL 2 TIMES DAILY
Qty: 15 FILM | Refills: 0 | Status: SHIPPED | OUTPATIENT
Start: 2023-11-22 | End: 2023-11-29

## 2023-11-22 RX ORDER — DIAZEPAM 5 MG
5 TABLET ORAL EVERY 8 HOURS PRN
Qty: 30 TABLET | Refills: 0 | Status: SHIPPED | OUTPATIENT
Start: 2023-11-22 | End: 2023-11-29

## 2023-11-22 RX ORDER — BUPRENORPHINE AND NALOXONE 8; 2 MG/1; MG/1
1 FILM, SOLUBLE BUCCAL; SUBLINGUAL 2 TIMES DAILY
Qty: 60 FILM | Refills: 0 | Status: SHIPPED | OUTPATIENT
Start: 2023-11-22 | End: 2023-11-22

## 2023-11-22 ASSESSMENT — PATIENT HEALTH QUESTIONNAIRE - PHQ9
10. IF YOU CHECKED OFF ANY PROBLEMS, HOW DIFFICULT HAVE THESE PROBLEMS MADE IT FOR YOU TO DO YOUR WORK, TAKE CARE OF THINGS AT HOME, OR GET ALONG WITH OTHER PEOPLE: EXTREMELY DIFFICULT
SUM OF ALL RESPONSES TO PHQ QUESTIONS 1-9: 22
SUM OF ALL RESPONSES TO PHQ QUESTIONS 1-9: 22

## 2023-11-22 ASSESSMENT — ENCOUNTER SYMPTOMS: NERVOUS/ANXIOUS: 1

## 2023-11-22 ASSESSMENT — PAIN SCALES - GENERAL: PAINLEVEL: NO PAIN (0)

## 2023-11-22 NOTE — PROGRESS NOTES
Assessment & Plan     Opiate abuse, episodic (H)    - naloxone (NARCAN) 4 MG/0.1ML nasal spray; Spray 1 spray (4 mg) into one nostril alternating nostrils as needed for opioid reversal every 2-3 minutes until assistance arrives  - traZODone (DESYREL) 100 MG tablet; Take 1 tablet (100 mg) by mouth at bedtime  - Urine Drug Screen; Future  - Urine Drug Screen  - diazepam (VALIUM) 5 MG tablet; Take 1 tablet (5 mg) by mouth every 8 hours as needed for anxiety or sleep  - Buprenorphine Urine, Qualitative; Future  - Buprenorphine Urine, Qualitative  - buprenorphine HCl-naloxone HCl (SUBOXONE) 8-2 MG per film; Place 1 Film under the tongue 2 times daily      I had a long discussion with the patient and his mom.  I did bring up the positive fentanyl screen.  Patient is adamant he has not used fentanyl for about 2 weeks.  I did bring up the half-life of being 2 to 3 days.  I suppose that if he had extreme large amount of fentanyl in his system he could still be positive.  He has been pro-form and was positive.  I discussed current situation with pharmacy.  They did contact the [facility].  There is no record of him ever being prescribed Suboxone.  I brought this up with him and his mom but he states that Suboxone is so plentiful that if you are an attic you can just take it off the counter.  Patient reports that he had grabbed 2 weeks worth and he will be out today.  He does bring with him on an empty Suboxone bag.  Patient will be given 1 week of Suboxone.  Will look into [place]'s current procedure and obtaining Suboxone.  Will address this next week when he returns.  Also repeat drug screen.  Also discussed with the patient that if he should and I hope he does not restart fentanyl to not use his previous dose.  If he has been off of it it likely would kill him.       Nicotine/Tobacco Cessation:  He reports that he has been smoking vaping device and cigarettes. He has never used smokeless tobacco.  Nicotine/Tobacco  Cessation Plan:         Depression Screening Follow Up        11/22/2023    10:39 AM   PHQ   PHQ-9 Total Score 22   Q9: Thoughts of better off dead/self-harm past 2 weeks More than half the days   F/U: Thoughts of suicide or self-harm No   F/U: Safety concerns Yes                 Follow Up    Follow Up Actions Taken patient will follow-up next week      Discussed the following ways the patient can remain in a safe environment:        No follow-ups on file.    Ortega Lakrin MD  [facility]    Brendan Coleman is a 23 year old, presenting for the following health issues:  Anxiety (Medication management, ER 11/07/23 withdrawls)      Patient arrives here for medication refill.  He was recently seen in the ER patient reports that he is from [facility].  Recently arrived here trying to get off opiates.  He states that his last fentanyl use was about 2 weeks ago.  He is states that he is on Suboxone.  He takes 8 mg twice a day.  Prior to that he had abuseed oxycodone.  While in [place] he had been on fentanyl for the last 10 months.  Recent drug screen in the ER was positive for fentanyl.  He is currently with his mother.  Patient states that when he was using fentanyl he used enough to kill the whole town.  He also was given diazepam through the ER.  He reports it is working well for his anxiety.  He is set up for a psychiatric evaluation.    History of Present Illness       Reason for visit:  Follow up from an ER visit    He eats 2-3 servings of fruits and vegetables daily.He consumes 1 sweetened beverage(s) daily.He exercises with enough effort to increase his heart rate 9 or less minutes per day.  He exercises with enough effort to increase his heart rate 3 or less days per week. He is missing 2 dose(s) of medications per week.                 Review of Systems   Psychiatric/Behavioral:  The patient is nervous/anxious.             Objective    /64 (BP Location: Right arm,  "Patient Position: Sitting, Cuff Size: Adult Regular)   Pulse 88   Temp 97.7  F (36.5  C) (Tympanic)   Resp 18   Ht 1.702 m (5' 7\")   Wt 72.3 kg (159 lb 8 oz)   SpO2 98%   BMI 24.98 kg/m    Body mass index is 24.98 kg/m .  Physical Exam   GENERAL: healthy, alert and no distress  NECK: no adenopathy, no asymmetry, masses, or scars and thyroid normal to palpation  RESP: lungs clear to auscultation - no rales, rhonchi or wheezes  CV: regular rate and rhythm, normal S1 S2, no S3 or S4, no murmur, click or rub, no peripheral edema and peripheral pulses strong  ABDOMEN: soft, nontender, no hepatosplenomegaly, no masses and bowel sounds normal  MS: no gross musculoskeletal defects noted, no edema    Results for orders placed or performed in visit on 11/22/23 (from the past 24 hour(s))   Urine Drug Screen    Narrative    The following orders were created for panel order Urine Drug Screen.  Procedure                               Abnormality         Status                     ---------                               -----------         ------                     Urine Drug Screen Panel[[id number]]      Abnormal            Final result                 Please view results for these tests on the individual orders.   Urine Drug Screen Panel   Result Value Ref Range    Amphetamines Urine Screen Negative Screen Negative    Barbituates Urine Screen Negative Screen Negative    Benzodiazepine Urine Screen Positive (A) Screen Negative    Cannabinoids Urine Screen Positive (A) Screen Negative    Cocaine Urine Screen Negative Screen Negative    Fentanyl Qual Urine Screen Positive (A) Screen Negative    Opiates Urine Screen Negative Screen Negative    PCP Urine Screen Negative Screen Negative   Buprenorphine Urine, Qualitative   Result Value Ref Range    Buprenorphine Qual Urine Screen Positive (A) Screen Negative                     "

## 2023-11-22 NOTE — NURSING NOTE
"Medication Reconciliation: Complete    Marcela Suresh LPN  11/22/2023 10:54 AM  Chief Complaint   Patient presents with    Anxiety     Medication management, ER 11/07/23 withdrawls       Initial /64 (BP Location: Right arm, Patient Position: Sitting, Cuff Size: Adult Regular)   Pulse 88   Temp 97.7  F (36.5  C) (Tympanic)   Resp 18   Ht 1.702 m (5' 7\")   Wt 72.3 kg (159 lb 8 oz)   SpO2 98%   BMI 24.98 kg/m   Estimated body mass index is 24.98 kg/m  as calculated from the following:    Height as of this encounter: 1.702 m (5' 7\").    Weight as of this encounter: 72.3 kg (159 lb 8 oz).  Medication Review: complete    The next two questions are to help us understand your food security.  If you are feeling you need any assistance in this area, we have resources available to support you today.          11/22/2023   SDOH- Food Insecurity   Within the past 12 months, did you worry that your food would run out before you got money to buy more? Y   Within the past 12 months, did the food you bought just not last and you didn t have money to get more? N         Health Care Directive:  Patient does not have a Health Care Directive or Living Will: Discussed advance care planning with patient; however, patient declined at this time.    Marcela Suresh LPN      "

## 2023-11-22 NOTE — TELEPHONE ENCOUNTER
Left message to call back. (Please have patient speak to a nurse in unit 4 as I am in [facility] and not always here)     Betarice House CMA on 11/22/2023 at 11:21 AM

## 2023-11-22 NOTE — COMMUNITY RESOURCES LIST (ENGLISH)
11/22/2023   [facility]  N/A  For additional resource needs, please contact your health insurance member services or your primary care team.  Phone: [phone]   Email: N/A   Address: [facility] [place] [address]   Hours: N/A        Food and Nutrition       Food pantry  1  [facility] [employer] [facility] Distance: 7.38 miles      In-Person   [address] [place] [address]  Language: English  Hours: Mon - Thu 11:00 AM - 3:30 PM  Fees: Free   Phone: (597) 799-3744 Email: info@RigUp Website: https://RigUp     2  [facility] Food Shelf Distance: 23.14 miles      [facility]   [address]  [place] [address]  Language: English  Hours: Thu 10:00 AM - 1:00 PM  Fees: Free   Phone: (468) 237-6252 Email: brando@L[employer] Website: https://www.Counsyl.com/[place]er[place]AreaFoodShelf/     SNAP application assistance  3  [facility] [employer] Opportunity Agency [facility] Distance: 8.08 miles      In-Person, Phone/Virtual   [address] [place] [address]  Language: English  Hours: Mon 8:00 AM - 4:30 PM Appt. Only, Tue - Thu 8:00 AM - 4:30 PM , Fri 8:00 AM - 4:30 PM Appt. Only  Fees: Free   Phone: (928) 551-5168 Website: http://www.AxoGen     4  [facility] Distance: 8.09 miles      [address] [place] [address]  Language: English  Hours: Mon - Fri 8:00 AM - 4:30 PM  Fees: Free   Phone: (312) 404-9174 Website: https://www.[facility]./[facility]/Health-Human-Services          Important Numbers & Websites       [address].org  Poison Control   (954) 469-9814 [place]po[place]  Suicide and Crisis Lifeline   [address].org  Childhelp National Child Abuse Hotline   379.494.3935 Childhelphotline.org  National Sexual Assault Hotline   (203) 572-7057 (HOPE) Rainn.org  National Runaway Safeline   (902) 313-6913 (RUNAWAY) [facility].org  Pregnancy  & Postpartum Support [place]   Call/text 073-205-3568 Ppsupportmn.org  Substance Abuse National Helpline ([facility]   999-939-HELP (1590) Findtreatment.gov  Emergency Services   916

## 2023-11-22 NOTE — TELEPHONE ENCOUNTER
Patient came to the facility and spoke with Ortega Larkin MD in person.    Christin Jeong LPN 11/22/2023 3:29 PM

## 2023-11-22 NOTE — TELEPHONE ENCOUNTER
Patient states he is returning a call from Dr. Larkin regarding his labs today. Please call back.    Alannah Mcconnell on 11/22/2023 at 12:58 PM

## 2023-11-22 NOTE — COMMUNITY RESOURCES LIST (ENGLISH)
11/22/2023   [facility]  N/A  For questions about this resource list or additional care needs, please contact your primary care clinic or care manager.  Phone: [phone]   Email: N/A   Address: [address] [place] [address]   Hours: N/A        Food and Nutrition       Food pantry  1  [facility] Distance: 7.38 miles      In-Person   [address]  [place] [address]  Language: English  Hours: Mon - Thu 11:00 AM - 3:30 PM  Fees: Free   Phone: (622) 690-1697 Email: info@[facility] Website: https://[facility]     2  [facility] Food Shelf Distance: 23.14 miles      Pick   [address] [place] [address]  Language: English  Hours: Thu 10:00 AM - 1:00 PM  Fees: Free   Phone: (581) 271-7325 Email: brando@Neuravi Website: https://www.Sunlasses.com.ng.com/[place]iverAreaFoodShelf/     SNAP application assistance  3  [facility] [employer] Opportunity Agency [facility] Distance: 8.08 miles      In-Person, Phone/Virtual   [address] [place] [address]  Language: English  Hours: Mon 8:00 AM - 4:30 PM Appt. Only, Tue - Thu 8:00 AM - 4:30 PM , Fri 8:00 AM - 4:30 PM Appt. Only  Fees: Free   Phone: (701) 848-2209 Website: http://www.jobsite123.org     4  [facility] Distance: 8.09 miles      [address] [place] [address]  Language: English  Hours: Mon - Fri 8:00 AM - 4:30 PM  Fees: Free   Phone: (832) 923-7775 Website: https://www.co.[place]./[facility]/Health-Human-Services          Important Numbers & Websites       Emergency Services   911  [facility] Services   311  Poison Control   (937) 613-2647  Suicide Prevention Lifeline   (614) 254-3708 (TALK)  Child Abuse Hotline   (964) 417-9813 (4-A-Child)  Sexual Assault Hotline   (693) 327-1647 (HOPE)  National Runaway Safeline   (906) 876-7599 (RUNAWAY)  All-Options Talkline   (849) 238-9554  Substance Abuse Referral    (473) 318-7397 (HELP)

## 2023-11-29 ENCOUNTER — OFFICE VISIT (OUTPATIENT)
Dept: FAMILY MEDICINE | Facility: OTHER | Age: 23
End: 2023-11-29
Attending: FAMILY MEDICINE
Payer: MEDICAID

## 2023-11-29 VITALS
RESPIRATION RATE: 20 BRPM | BODY MASS INDEX: 24.93 KG/M2 | HEART RATE: 88 BPM | SYSTOLIC BLOOD PRESSURE: 94 MMHG | WEIGHT: 159.2 LBS | OXYGEN SATURATION: 96 % | TEMPERATURE: 99 F | DIASTOLIC BLOOD PRESSURE: 54 MMHG

## 2023-11-29 DIAGNOSIS — F11.10 OPIATE ABUSE, EPISODIC (H): ICD-10-CM

## 2023-11-29 PROCEDURE — 99213 OFFICE O/P EST LOW 20 MIN: CPT | Performed by: FAMILY MEDICINE

## 2023-11-29 RX ORDER — TRAZODONE HYDROCHLORIDE 100 MG/1
100 TABLET ORAL AT BEDTIME
Qty: 90 TABLET | Refills: 1 | Status: SHIPPED | OUTPATIENT
Start: 2023-11-29

## 2023-11-29 RX ORDER — BUPRENORPHINE AND NALOXONE 8; 2 MG/1; MG/1
1 FILM, SOLUBLE BUCCAL; SUBLINGUAL 2 TIMES DAILY
Qty: 60 FILM | Refills: 0 | Status: SHIPPED | OUTPATIENT
Start: 2023-11-29 | End: 2023-11-30

## 2023-11-29 RX ORDER — DIAZEPAM 5 MG
5 TABLET ORAL EVERY 12 HOURS PRN
Qty: 60 TABLET | Refills: 0 | Status: SHIPPED | OUTPATIENT
Start: 2023-11-29 | End: 2023-11-30

## 2023-11-29 ASSESSMENT — PAIN SCALES - GENERAL: PAINLEVEL: NO PAIN (0)

## 2023-11-29 ASSESSMENT — PATIENT HEALTH QUESTIONNAIRE - PHQ9
10. IF YOU CHECKED OFF ANY PROBLEMS, HOW DIFFICULT HAVE THESE PROBLEMS MADE IT FOR YOU TO DO YOUR WORK, TAKE CARE OF THINGS AT HOME, OR GET ALONG WITH OTHER PEOPLE: SOMEWHAT DIFFICULT
SUM OF ALL RESPONSES TO PHQ QUESTIONS 1-9: 17
SUM OF ALL RESPONSES TO PHQ QUESTIONS 1-9: 17

## 2023-11-29 NOTE — NURSING NOTE
Patient here with mom for medication follow up. He has not picked up the Trazodone. Medication Reconciliation: complete.    Ngoc Chen LPN  11/29/2023 3:33 PM

## 2023-11-30 RX ORDER — DIAZEPAM 5 MG
5 TABLET ORAL EVERY 12 HOURS PRN
Qty: 60 TABLET | Refills: 0 | Status: SHIPPED | OUTPATIENT
Start: 2023-11-30 | End: 2023-12-28

## 2023-11-30 RX ORDER — BUPRENORPHINE AND NALOXONE 8; 2 MG/1; MG/1
1 FILM, SOLUBLE BUCCAL; SUBLINGUAL 2 TIMES DAILY
Qty: 60 FILM | Refills: 0 | Status: SHIPPED | OUTPATIENT
Start: 2023-11-30 | End: 2023-12-28

## 2023-11-30 NOTE — PROGRESS NOTES
SUBJECTIVE:   Jared Sanchez is a 23 year old male who presents to clinic today for the following health issues: Follow-up opioid abuse    Patient arrives here for follow-up of opiate abuse.  He is with his mother.  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  He reports that he is staying off the opiates.  Continues to use Suboxone 1 twice a day.  Requesting refills of his Valium and also his Suboxone          Patient Active Problem List    Diagnosis Date Noted    Opiate abuse, episodic (H) 11/22/2023     Priority: Medium     No past medical history on file.     Review of Systems     OBJECTIVE:     BP 94/54   Pulse 88   Temp 99  F (37.2  C)   Resp 20   Wt 72.2 kg (159 lb 3.2 oz)   SpO2 96%   BMI 24.93 kg/m    Body mass index is 24.93 kg/m .  Physical Exam  Constitutional:       Appearance: Normal appearance.   Cardiovascular:      Rate and Rhythm: Normal rate and regular rhythm.      Heart sounds: No murmur heard.  Neurological:      Mental Status: He is alert.   Psychiatric:         Mood and Affect: Mood normal.         Thought Content: Thought content normal.         Diagnostic Test Results:  none     ASSESSMENT/PLAN:         (F11.10) Opiate abuse, episodic (H)  Comment: Continue with Suboxone.  1 month given.  Decrease his Valium to 5 mg twice a day.  Follow-up in 1 month.  Plan is to continue the decrease in his Valium.  Urine drug screen in 1 month.  Plan: buprenorphine HCl-naloxone HCl (SUBOXONE) 8-2         MG per film, traZODone (DESYREL) 100 MG tablet,        diazepam (VALIUM) 5 MG tablet              Ortega Larkin MD  [facility]

## 2023-12-28 ENCOUNTER — OFFICE VISIT (OUTPATIENT)
Dept: FAMILY MEDICINE | Facility: OTHER | Age: 23
End: 2023-12-28
Attending: FAMILY MEDICINE
Payer: MEDICAID

## 2023-12-28 VITALS
SYSTOLIC BLOOD PRESSURE: 92 MMHG | BODY MASS INDEX: 25.25 KG/M2 | DIASTOLIC BLOOD PRESSURE: 58 MMHG | TEMPERATURE: 98.9 F | HEART RATE: 92 BPM | RESPIRATION RATE: 20 BRPM | WEIGHT: 161.2 LBS | OXYGEN SATURATION: 97 %

## 2023-12-28 DIAGNOSIS — F11.10 OPIATE ABUSE, EPISODIC (H): Primary | ICD-10-CM

## 2023-12-28 DIAGNOSIS — R82.5 POSITIVE URINE DRUG SCREEN: ICD-10-CM

## 2023-12-28 LAB
AMPHETAMINES UR QL SCN: ABNORMAL
BARBITURATES UR QL SCN: ABNORMAL
BENZODIAZ UR QL SCN: ABNORMAL
BZE UR QL SCN: ABNORMAL
CANNABINOIDS UR QL SCN: ABNORMAL
FENTANYL UR QL: ABNORMAL
OPIATES UR QL SCN: ABNORMAL
PCP QUAL URINE (ROCHE): ABNORMAL

## 2023-12-28 PROCEDURE — 80307 DRUG TEST PRSMV CHEM ANLYZR: CPT | Mod: ZL | Performed by: FAMILY MEDICINE

## 2023-12-28 PROCEDURE — 80325 AMPHETAMINES 3OR 4: CPT | Mod: ZL | Performed by: FAMILY MEDICINE

## 2023-12-28 PROCEDURE — 99214 OFFICE O/P EST MOD 30 MIN: CPT | Performed by: FAMILY MEDICINE

## 2023-12-28 PROCEDURE — 80354 DRUG SCREENING FENTANYL: CPT | Mod: ZL | Performed by: FAMILY MEDICINE

## 2023-12-28 RX ORDER — BUPRENORPHINE AND NALOXONE 8; 2 MG/1; MG/1
1 FILM, SOLUBLE BUCCAL; SUBLINGUAL 2 TIMES DAILY
Qty: 60 FILM | Refills: 0 | Status: SHIPPED | OUTPATIENT
Start: 2023-12-28 | End: 2024-01-03

## 2023-12-28 RX ORDER — DIAZEPAM 5 MG
5 TABLET ORAL EVERY 12 HOURS PRN
Qty: 50 TABLET | Refills: 0 | Status: SHIPPED | OUTPATIENT
Start: 2023-12-28 | End: 2024-02-14

## 2023-12-28 RX ORDER — BUPRENORPHINE AND NALOXONE 8; 2 MG/1; MG/1
1 FILM, SOLUBLE BUCCAL; SUBLINGUAL 2 TIMES DAILY
Qty: 60 FILM | Refills: 0 | Status: SHIPPED | OUTPATIENT
Start: 2023-12-28 | End: 2023-12-29

## 2023-12-28 RX ORDER — DIAZEPAM 5 MG
5 TABLET ORAL EVERY 12 HOURS PRN
Qty: 55 TABLET | Refills: 0 | Status: SHIPPED | OUTPATIENT
Start: 2023-12-28 | End: 2024-02-14

## 2023-12-28 ASSESSMENT — PAIN SCALES - GENERAL: PAINLEVEL: MODERATE PAIN (5)

## 2023-12-28 NOTE — NURSING NOTE
Patient here for medication follow up and refill. Medication Reconciliation: complete.    Ngoc Chen LPN  12/28/2023 3:45 PM

## 2023-12-29 PROBLEM — R82.5 POSITIVE URINE DRUG SCREEN: Status: ACTIVE | Noted: 2023-12-29

## 2023-12-29 NOTE — PROGRESS NOTES
SUBJECTIVE:   Jared Sanchez is a 23 year old male who presents to clinic today for the following health issues: Medication follow-up    Patient arrives here for medication follow-up.  He is with his mom.  Versus drugs.  Continues to work.  States he is working up to 80 some hours per week.    History of Present Illness       Reason for visit:  Follow up visit/refills    He eats 2-3 servings of fruits and vegetables daily.He consumes 2 sweetened beverage(s) daily.He exercises with enough effort to increase his heart rate 60 or more minutes per day.  He exercises with enough effort to increase his heart rate 7 days per week.   He is taking medications regularly.        Patient Active Problem List    Diagnosis Date Noted    Positive urine drug screen 12/29/2023     Priority: Medium    Opiate abuse, episodic (H) 11/22/2023     Priority: Medium     No past medical history on file.     Review of Systems     OBJECTIVE:     BP 92/58   Pulse 92   Temp 98.9  F (37.2  C)   Resp 20   Wt 73.1 kg (161 lb 3.2 oz)   SpO2 97%   BMI 25.25 kg/m    Body mass index is 25.25 kg/m .  Physical Exam  Constitutional:       Appearance: Normal appearance.   Neurological:      Mental Status: He is alert.   Psychiatric:         Mood and Affect: Mood normal.         Diagnostic Test Results:  Results for orders placed or performed in visit on 12/28/23 (from the past 24 hour(s))   Urine Drug Screen    Narrative    The following orders were created for panel order Urine Drug Screen.  Procedure                               Abnormality         Status                     ---------                               -----------         ------                     Urine Drug Screen Panel[[id number]]      Abnormal            Final result                 Please view results for these tests on the individual orders.   Urine Drug Screen Panel   Result Value Ref Range    Amphetamines Urine Screen Positive (A) Screen Negative    Barbituates Urine Screen  Negative Screen Negative    Benzodiazepine Urine Screen Positive (A) Screen Negative    Cannabinoids Urine Screen Positive (A) Screen Negative    Cocaine Urine Screen Negative Screen Negative    Fentanyl Qual Urine Screen Positive (A) Screen Negative    Opiates Urine Screen Negative Screen Negative    PCP Urine Screen Negative Screen Negative       ASSESSMENT/PLAN:         (F11.10) Opiate abuse, episodic (H)  (primary encounter diagnosis)  Comment:   Plan: buprenorphine HCl-naloxone HCl (SUBOXONE) 8-2         MG per film, diazepam (VALIUM) 5 MG tablet,         diazepam (VALIUM) 5 MG tablet, Urine Drug         Screen, Fentanyl and Metabolite Quantitative,         Urine, Amphetamine Quantitative Urine,         DISCONTINUED: buprenorphine HCl-naloxone HCl         (SUBOXONE) 8-2 MG per film       Patient was given two prescription for Suboxone.  After his drug screen came back positive for fentanyl and amphetamines I did cancel his 2 prescription for Suboxone.  Also canceled the second Valium prescription.  I had started tapering him on Valium.  He had picked up his first month so far.  Did not  either of his Suboxone prescriptions.  Will proceed with quantitative studies.      Ortega Larkin MD  [facility]

## 2024-01-01 LAB
AMPHET UR-MCNC: <50 NG/ML
MDA UR-MCNC: <200 NG/ML
MDEA UR-MCNC: <200 NG/ML
MDMA UR-MCNC: <200 NG/ML
METHAMPHET UR-MCNC: <200 NG/ML
PHENTERMINE UR CFM-MCNC: <200 NG/ML

## 2024-01-02 ENCOUNTER — TELEPHONE (OUTPATIENT)
Dept: FAMILY MEDICINE | Facility: OTHER | Age: 24
End: 2024-01-02
Payer: COMMERCIAL

## 2024-01-02 DIAGNOSIS — F11.10 OPIATE ABUSE, EPISODIC (H): ICD-10-CM

## 2024-01-02 NOTE — TELEPHONE ENCOUNTER
Patient had a Urine drug toxicology screening done on 12/28/2023. Patient was also seen by provider on said date.     Ewelina Mae LPN on 1/2/2024 at 12:03 PM

## 2024-01-02 NOTE — TELEPHONE ENCOUNTER
Patient would like a call from either dr rodriguez or a nurse regarding why his ua is dirty and what it is dirty with as [place] is leaving the state in a month and has only been taking what's prescribed. Call number on file if needs to leave message  is set up.    Lesley Kerns on 1/2/2024 at 11:44 AM

## 2024-01-03 LAB
FENTANYL UR-MCNC: <1 NG/ML
NORFENTANYL UR-MCNC: 6.4 NG/ML

## 2024-01-03 RX ORDER — BUPRENORPHINE AND NALOXONE 8; 2 MG/1; MG/1
1 FILM, SOLUBLE BUCCAL; SUBLINGUAL 2 TIMES DAILY
Qty: 15 FILM | Refills: 0 | Status: SHIPPED | OUTPATIENT
Start: 2024-01-03 | End: 2024-01-08

## 2024-01-03 NOTE — TELEPHONE ENCOUNTER
Please advise pt his urine drug screen was again pos for fentanyl  we are waiting for confirmation.  When is he leaving and when is he going to be back ?

## 2024-01-03 NOTE — TELEPHONE ENCOUNTER
Patient was notified, and verbalized understanding. No further questions at this time.   Reva Olguin LPN on 1/3/2024 at 1:02 PM

## 2024-01-03 NOTE — TELEPHONE ENCOUNTER
"Patient advised of information below. Did let him know we are waiting of confirmation as well-he is understanding of this. He states he \"is baffled\" at results, stating last use of Fentanyl was end of October. He says he is out of Suboxone. He said he is leaving today-around 3 PM. Will be back on the 8th. I did tell him I would let you know this information and how you would like to proceed, did tell him we would call back as soon as we can, but did not guarantee a time.   Reva Olguin LPN...................1/3/2024   11:53 AM   "

## 2024-01-04 NOTE — COMMUNITY RESOURCES LIST (ENGLISH)
01/04/2024   [facility]  N/A  For questions about this resource list or additional care needs, please contact your primary care clinic or care manager.  Phone: [phone]   Email: N/A   Address: [address] [place] [address]   Hours: N/A        Food and Nutrition       Food pantry  1  [facility] Distance: 2.01 miles      In-Person   [address] [place] [address]  Language: English  Hours: Mon - Thu 11:00 AM - 3:30 PM  Fees: Free   Phone: (258) 702-5015 Email: info@[employer] Website: https://Apothesource     2  [facility] Food Shelf Distance: 14.33 miles      [facility]   [address] [place] [address]  Language: English  Hours: Thu 10:00 AM - 1:00 PM  Fees: Free   Phone: (711) 116-4463 Email: brando@[employer] Website: https://www.PicPrizes.BlueRoads/[place] LogicRiverAreaFoodShelf/     SNAP application assistance  3  [facility] Distance: 1.27 miles      [address] [place] [address]  Language: English  Hours: Mon - Fri 8:00 AM - 4:30 PM  Fees: Free   Phone: (446) 364-4301 Website: https://www.[facility]./[facility]/Health-Human-Services     4  [facility] [employer] [facility] Distance: 1.28 miles      In-Person, Phone/Virtual   [address] [place] [address]  Language: English  Hours: Mon - Fri 8:00 AM - 4:30 PM  Fees: Free   Phone: (730) 514-4882 Website: https://www.Edxact.BlueRoads/partner/viwucajws-kvwhdccj-mshzsbjihhy-[facility]-grand-[facility]          Important Numbers & Websites       Emergency Services   911  City Services   311  Poison Control   (316) 205-1602  Suicide Prevention Lifeline   (355) 801-8455 (TALK)  Child Abuse Hotline   (853) 975-8115 (4-A-Child)  Sexual Assault Hotline   (711) 334-8451 (HOPE)  National Runaway Safeline   (407) 108-4744 (RUNAWAY)  All-Options Talkline   (373) 626-3818  Substance Abuse  Referral   (612) 378-5413 (HELP)

## 2024-01-08 DIAGNOSIS — F11.10 OPIATE ABUSE, EPISODIC (H): ICD-10-CM

## 2024-01-08 RX ORDER — BUPRENORPHINE AND NALOXONE 8; 2 MG/1; MG/1
1 FILM, SOLUBLE BUCCAL; SUBLINGUAL 2 TIMES DAILY
Qty: 60 FILM | Refills: 0 | Status: SHIPPED | OUTPATIENT
Start: 2024-01-08 | End: 2024-02-02

## 2024-02-02 ENCOUNTER — TELEPHONE (OUTPATIENT)
Dept: FAMILY MEDICINE | Facility: OTHER | Age: 24
End: 2024-02-02
Payer: COMMERCIAL

## 2024-02-02 NOTE — TELEPHONE ENCOUNTER
Left message for patient to return call to clinic.  Per verbal from Ortega Larkin MD, he has sent in prescription on Suboxone to [facility] pharmacy this morning.  Patient does have follow up appointment scheduled at this time for 02/14/24.    Marcela Suresh LPN............2/2/2024 8:28 AM

## 2024-02-05 NOTE — TELEPHONE ENCOUNTER
Called and left message for a return call from patient. I did check with pharmacy this medication is not refillable until 02/19/2024. Ngoc Chen LPN .......................2/5/2024  10:22 AM

## 2024-02-07 NOTE — TELEPHONE ENCOUNTER
Several attempts to reach patient will close note at this time. Ngoc Chen LPN .......................2/7/2024  4:13 PM

## 2024-02-12 DIAGNOSIS — F11.10 OPIATE ABUSE, EPISODIC (H): ICD-10-CM

## 2024-02-14 ENCOUNTER — OFFICE VISIT (OUTPATIENT)
Dept: FAMILY MEDICINE | Facility: OTHER | Age: 24
End: 2024-02-14
Attending: FAMILY MEDICINE
Payer: MEDICAID

## 2024-02-14 VITALS
TEMPERATURE: 98.9 F | RESPIRATION RATE: 20 BRPM | DIASTOLIC BLOOD PRESSURE: 64 MMHG | OXYGEN SATURATION: 97 % | WEIGHT: 149.4 LBS | BODY MASS INDEX: 23.4 KG/M2 | SYSTOLIC BLOOD PRESSURE: 100 MMHG | HEART RATE: 110 BPM

## 2024-02-14 DIAGNOSIS — F11.10 OPIATE ABUSE, EPISODIC (H): ICD-10-CM

## 2024-02-14 PROBLEM — R82.5 POSITIVE URINE DRUG SCREEN: Status: RESOLVED | Noted: 2023-12-29 | Resolved: 2024-02-14

## 2024-02-14 PROCEDURE — 99214 OFFICE O/P EST MOD 30 MIN: CPT | Performed by: FAMILY MEDICINE

## 2024-02-14 PROCEDURE — G0463 HOSPITAL OUTPT CLINIC VISIT: HCPCS

## 2024-02-14 RX ORDER — DIAZEPAM 5 MG
5 TABLET ORAL EVERY 12 HOURS PRN
Qty: 40 TABLET | Refills: 1 | Status: SHIPPED | OUTPATIENT
Start: 2024-02-14 | End: 2024-05-29

## 2024-02-14 RX ORDER — DIAZEPAM 5 MG
TABLET ORAL
Qty: 55 TABLET | Refills: 0 | OUTPATIENT
Start: 2024-02-14

## 2024-02-14 ASSESSMENT — PAIN SCALES - GENERAL: PAINLEVEL: NO PAIN (0)

## 2024-02-14 NOTE — NURSING NOTE
Patient here for medication follow up. Medication Reconciliation: complete.    Ngoc Chen LPN  2/14/2024 4:03 PM

## 2024-02-14 NOTE — TELEPHONE ENCOUNTER
[facility] sent Rx request for the following:      Requested Prescriptions   Pending Prescriptions Disp Refills    diazepam (VALIUM) 5 MG tablet [Pharmacy Med Name: diazepam 5 mg tablet] 55 tablet 0     Sig: TAKE 1 TABLET BY MOUTH EVERY 12 HOURS AS NEEDED FOR ANXIETY OR SLEEP       There is no refill protocol information for this order        Last Prescription Date:   12/28/23  Last Fill Qty/Refills:         55, R-0    Last Office Visit:              12/28/223   Future Office visit:           Today    Per LOV note:  (F11.10) Opiate abuse, episodic (H)  (primary encounter diagnosis)  Comment:   Plan: buprenorphine HCl-naloxone HCl (SUBOXONE) 8-2         MG per film, diazepam (VALIUM) 5 MG tablet,         diazepam (VALIUM) 5 MG tablet, Urine Drug         Screen, Fentanyl and Metabolite Quantitative,         Urine, Amphetamine Quantitative Urine,         DISCONTINUED: buprenorphine HCl-naloxone HCl         (SUBOXONE) 8-2 MG per film       Patient was given two prescription for Suboxone.  After his drug screen came back positive for fentanyl and amphetamines I did cancel his 2 prescription for Suboxone.  Also canceled the second Valium prescription.  I had started tapering him on Valium.  He had picked up his first month so far.  Did not  either of his Suboxone prescriptions.  Will proceed with quantitative studies.    Appointment today.     Marcela Thomas RN .............. 2/14/2024  1:51 PM

## 2024-02-15 NOTE — PROGRESS NOTES
SUBJECTIVE:   Jared Sanchez is a 24 year old male who presents to clinic today for the following health issues:  Follow-up Valium refilled  Patient arrives here for follow-up and Valium refill.  Patient reports that he has enough medication for 2 months on his Suboxone.  He ran out of Valium approximately 4 5 days ago and reports he has had quite a bit of anxiety.  States when he is on the Valium 1 or 2 a day he does very well and does not necessarily have thoughts of getting back on the fentanyl.  Patient feels he needs about 40 a month to control his anxiety.  Patient is currently got an apartment.  Continues to be employed.    History of Present Illness       Reason for visit:  Follow up    He eats 2-3 servings of fruits and vegetables daily.He consumes 1 sweetened beverage(s) daily.He exercises with enough effort to increase his heart rate 60 or more minutes per day.  He exercises with enough effort to increase his heart rate 6 days per week.   He is taking medications regularly.      Patient Active Problem List    Diagnosis Date Noted    Opiate abuse, episodic (H) 11/22/2023     Priority: Medium     No past medical history on file.   No past surgical history on file.    Review of Systems     OBJECTIVE:     /64   Pulse 110   Temp 98.9  F (37.2  C)   Resp 20   Wt 67.8 kg (149 lb 6.4 oz)   SpO2 97%   BMI 23.40 kg/m    Body mass index is 23.4 kg/m .  Physical Exam    Diagnostic Test Results:  none     ASSESSMENT/PLAN:         (F11.10) Opiate abuse, episodic (H)  Comment: Continue Valium at 40 tablets/month.  Patient is given 1 refill.  Follow-up for  Plan: diazepam (VALIUM) 5 MG tablet       The next refill.   reviewed last prescription for diazepam was 12/28    Ortega Larkin MD  [facility]

## 2024-03-11 RX ORDER — BUPRENORPHINE AND NALOXONE 8; 2 MG/1; MG/1
1 FILM, SOLUBLE BUCCAL; SUBLINGUAL 2 TIMES DAILY
Qty: 60 FILM | Refills: 0 | Status: SHIPPED | OUTPATIENT
Start: 2024-03-11 | End: 2024-05-29

## 2024-03-11 NOTE — TELEPHONE ENCOUNTER
Requested Prescriptions   Pending Prescriptions Disp Refills    buprenorphine HCl-naloxone HCl (SUBOXONE) 8-2 MG per film 60 Film 0     Sig: Place 1 Film under the tongue 2 times daily       There is no refill protocol information for this order     Last Prescription Date:   2/2/24  Last Fill Qty/Refills:         60 film, R-0    Last Office Visit:              2/14/24   Future Office visit:           3/13/24    Per 12/28/23 OV note:Patient was given two prescription for Suboxone. After his drug screen came back positive for fentanyl and amphetamines I did cancel his 2 prescription for Suboxone.  Also canceled the second Valium prescription.  I had started tapering him on Valium.  He had picked up his first month so far.  Did not  either of his Suboxone prescriptions.  Will proceed with quantitative studies.     Per 2/14/24 OV Note:  Patient reports that he has enough medication for 2 months on his Suboxone.     Please advise if Pt needs to wait for appointment Wednesday.    Unable to complete prescription refill per RN Medication Refill Policy.     Marcela Thomas RN .............. 3/11/2024  9:51 AM

## 2024-03-11 NOTE — TELEPHONE ENCOUNTER
Reason for call: Medication or medication refill    Name of medication requested: buprenorphine    How many days of medication do you have left? 0    What pharmacy do you use? [facility]    Preferred method for responding to this message: Telephone Call    Phone number patient can be reached at: Cell number on file:    Telephone Information:   Mobile [phone]       If we cannot reach you directly, may we leave a detailed response at the number you provided? Yes    Ewelina Panchal on 3/11/2024 at 9:15 AM

## 2024-03-27 ENCOUNTER — OFFICE VISIT (OUTPATIENT)
Dept: FAMILY MEDICINE | Facility: OTHER | Age: 24
End: 2024-03-27
Attending: FAMILY MEDICINE
Payer: COMMERCIAL

## 2024-03-27 VITALS
RESPIRATION RATE: 20 BRPM | BODY MASS INDEX: 22.58 KG/M2 | HEART RATE: 79 BPM | DIASTOLIC BLOOD PRESSURE: 64 MMHG | SYSTOLIC BLOOD PRESSURE: 92 MMHG | WEIGHT: 144.2 LBS | TEMPERATURE: 98.7 F | OXYGEN SATURATION: 95 %

## 2024-03-27 DIAGNOSIS — F11.10 OPIATE ABUSE, EPISODIC (H): ICD-10-CM

## 2024-03-27 PROCEDURE — G0463 HOSPITAL OUTPT CLINIC VISIT: HCPCS

## 2024-03-27 PROCEDURE — 99214 OFFICE O/P EST MOD 30 MIN: CPT | Performed by: FAMILY MEDICINE

## 2024-03-27 RX ORDER — BUPRENORPHINE AND NALOXONE 8; 2 MG/1; MG/1
1 FILM, SOLUBLE BUCCAL; SUBLINGUAL 2 TIMES DAILY
Qty: 60 FILM | Refills: 1 | Status: SHIPPED | OUTPATIENT
Start: 2024-03-27 | End: 2024-05-29

## 2024-03-27 RX ORDER — DIAZEPAM 5 MG
5 TABLET ORAL EVERY 12 HOURS PRN
Qty: 40 TABLET | Refills: 1 | Status: SHIPPED | OUTPATIENT
Start: 2024-03-27 | End: 2024-05-29

## 2024-03-27 ASSESSMENT — PAIN SCALES - GENERAL: PAINLEVEL: NO PAIN (0)

## 2024-03-27 NOTE — NURSING NOTE
Patient here for medication review and refills. Medication Reconciliation: complete.    Ngoc Chen LPN  3/27/2024 4:09 PM

## 2024-03-28 NOTE — PROGRESS NOTES
SUBJECTIVE:   Jared Sanchez is a 24 year old male who presents to clinic today for the following health issues:  Medication refill  Patient arrives here for medication refill.  Is currently taking a little under 40 tablets of Valium a month.  Further history into his drug usage patient typically in the past has been able to go 9 months to a year and then relapses.  States he gets bored.  He states he is very doing very well on the Suboxone and the Valium currently.  Valium helps relieve the anxiety about going back to narcotics.    History of Present Illness       Reason for visit:  Follow up    He eats 4 or more servings of fruits and vegetables daily.He consumes 2 sweetened beverage(s) daily.He exercises with enough effort to increase his heart rate 60 or more minutes per day.  He exercises with enough effort to increase his heart rate 6 days per week.   He is taking medications regularly.        Patient Active Problem List    Diagnosis Date Noted    Opiate abuse, episodic (H) 11/22/2023     Priority: Medium     No past medical history on file.   No past surgical history on file.  No Known Allergies    Review of Systems     OBJECTIVE:     BP 92/64   Pulse 79   Temp 98.7  F (37.1  C)   Resp 20   Wt 65.4 kg (144 lb 3.2 oz)   SpO2 95%   BMI 22.58 kg/m    Body mass index is 22.58 kg/m .  Physical Exam  Constitutional:       Appearance: Normal appearance.   Cardiovascular:      Rate and Rhythm: Normal rate and regular rhythm.   Abdominal:      General: Abdomen is flat.   Neurological:      Mental Status: He is alert.   Psychiatric:         Mood and Affect: Mood normal.         Diagnostic Test Results:  none     ASSESSMENT/PLAN:         (F11.10) Opiate abuse, episodic (H)  Comment:   Plan: diazepam (VALIUM) 5 MG tablet, buprenorphine         HCl-naloxone HCl (SUBOXONE) 8-2 MG per film        Will continue the Suboxone and Valium for now.  Seems he relapses after about a year of sobriety.  Advised him would  likely will continue the Valium for up to a year maybe a year and a half and then start a slow taper once he has been stabilized for long-term.  Patient will follow-up in 2 months      Ortega Larkin MD  [facility]

## 2024-05-29 ENCOUNTER — OFFICE VISIT (OUTPATIENT)
Dept: FAMILY MEDICINE | Facility: OTHER | Age: 24
End: 2024-05-29
Attending: FAMILY MEDICINE
Payer: COMMERCIAL

## 2024-05-29 VITALS
WEIGHT: 140.2 LBS | DIASTOLIC BLOOD PRESSURE: 72 MMHG | OXYGEN SATURATION: 98 % | BODY MASS INDEX: 21.96 KG/M2 | SYSTOLIC BLOOD PRESSURE: 118 MMHG | HEART RATE: 114 BPM | TEMPERATURE: 98.9 F | RESPIRATION RATE: 22 BRPM

## 2024-05-29 DIAGNOSIS — F11.10 OPIATE ABUSE, EPISODIC (H): Primary | ICD-10-CM

## 2024-05-29 LAB — CREAT UR-MCNC: 306 MG/DL

## 2024-05-29 PROCEDURE — 80359 METHYLENEDIOXYAMPHETAMINES: CPT | Mod: ZL | Performed by: FAMILY MEDICINE

## 2024-05-29 PROCEDURE — G2211 COMPLEX E/M VISIT ADD ON: HCPCS | Performed by: FAMILY MEDICINE

## 2024-05-29 PROCEDURE — 99214 OFFICE O/P EST MOD 30 MIN: CPT | Performed by: FAMILY MEDICINE

## 2024-05-29 PROCEDURE — 80367 DRUG SCREENING PROPOXYPHENE: CPT | Mod: ZL | Performed by: FAMILY MEDICINE

## 2024-05-29 PROCEDURE — G0463 HOSPITAL OUTPT CLINIC VISIT: HCPCS

## 2024-05-29 RX ORDER — DIAZEPAM 5 MG
5 TABLET ORAL EVERY 12 HOURS PRN
Qty: 40 TABLET | Refills: 1 | Status: SHIPPED | OUTPATIENT
Start: 2024-05-29 | End: 2024-06-26

## 2024-05-29 RX ORDER — BUPRENORPHINE AND NALOXONE 8; 2 MG/1; MG/1
1 FILM, SOLUBLE BUCCAL; SUBLINGUAL 2 TIMES DAILY
Qty: 60 FILM | Refills: 1 | Status: SHIPPED | OUTPATIENT
Start: 2024-05-29 | End: 2024-05-30

## 2024-05-29 ASSESSMENT — PAIN SCALES - GENERAL: PAINLEVEL: NO PAIN (0)

## 2024-05-30 ENCOUNTER — TELEPHONE (OUTPATIENT)
Dept: FAMILY MEDICINE | Facility: OTHER | Age: 24
End: 2024-05-30
Payer: COMMERCIAL

## 2024-05-30 RX ORDER — BUPRENORPHINE HYDROCHLORIDE AND NALOXONE HYDROCHLORIDE DIHYDRATE 8; 2 MG/1; MG/1
1 TABLET SUBLINGUAL 2 TIMES DAILY
Qty: 60 TABLET | Refills: 0 | Status: SHIPPED | OUTPATIENT
Start: 2024-05-30 | End: 2024-06-26

## 2024-05-30 NOTE — PROGRESS NOTES
SUBJECTIVE:   Jared Sanchez is a 24 year old male who presents to clinic today for the following health issues: Follow-up opioid use disorder    Patient arrives here for follow-up of opiate use disorder.  He is doing well.  Continues to work.  Taking Valium as needed.  He is continue to remain off the fentanyl.  He has been sober since November.  Last Suboxone was today yesterday was his last Valium.  After the visit was over he did mention that he is having irritation to his gums.  Was wondering about changing to oral.  This was after prescriptions were written out.          Patient Active Problem List    Diagnosis Date Noted    Opiate abuse, episodic (H) 11/22/2023     Priority: Medium     No past medical history on file.     Review of Systems     OBJECTIVE:     /72   Pulse 114   Temp 98.9  F (37.2  C)   Resp 22   Wt 63.6 kg (140 lb 3.2 oz)   SpO2 98%   BMI 21.96 kg/m    Body mass index is 21.96 kg/m .  Physical Exam  Constitutional:       Appearance: Normal appearance.   Neurological:      Mental Status: He is alert.   Psychiatric:         Mood and Affect: Mood normal.         Thought Content: Thought content normal.         Diagnostic Test Results:  none     ASSESSMENT/PLAN:         (F11.10) Opiate abuse, episodic (H)  Comment:   Plan: buprenorphine HCl-naloxone HCl (SUBOXONE) 8-2         MG per film, diazepam (VALIUM) 5 MG tablet,         Drug Confirmation Panel Urine with Creat      Drug screen done.  Will check for availability of the tablet.    The longitudinal plan of care for the diagnosis(es)/condition(s) as documented were addressed during this visit. Due to the added complexity in care, I will continue to support Jared in the subsequent management and with ongoing continuity of care.    Ortega Larkin MD  [facility]

## 2024-05-30 NOTE — TELEPHONE ENCOUNTER
Called patient letting him know that his insurance will probably not allow for the sublingual tablets this month.  says to finish out the strips this month and fill the sublingual tablets next month. He was in agreement. Ngoc Chen LPN .......................5/30/2024  3:31 PM

## 2024-06-03 LAB
BUPRENORPHINE UR CFM-MCNC: 1216 NG/ML
BUPRENORPHINE/CREAT UR: 397 NG/MG {CREAT}
NALOXONE UR CFM-MCNC: 1144 NG/ML
NALOXONE: 374 NG/MG {CREAT}
NORBUPRENORPHINE UR CFM-MCNC: 1302 NG/ML
NORBUPRENORPHINE/CREAT UR: 425 NG/MG {CREAT}

## 2024-06-04 ENCOUNTER — CARE COORDINATION (OUTPATIENT)
Dept: INTERNAL MEDICINE | Facility: OTHER | Age: 24
End: 2024-06-04
Payer: COMMERCIAL

## 2024-06-04 NOTE — PROGRESS NOTES
Clinic Care Coordination Contact  Care Team Conversations    P/c to pt.   Introduced CHW and discussed new MOUD services available at [facility] to include RN supports.  Pt stated that he is open to any support services that are available.  He gave approval to attend his next appointment with Dr. Larkin to introduce ourselves.  Discussed that if he has questions or concerns prior to his next appointment, pt can call in and request to speak to MOUD CHW or RN.             RN/CHW will plan to attend next appointment on 7/23/24.

## 2024-06-24 ENCOUNTER — PATIENT OUTREACH (OUTPATIENT)
Dept: CARE COORDINATION | Facility: CLINIC | Age: 24
End: 2024-06-24
Payer: COMMERCIAL

## 2024-06-24 DIAGNOSIS — F11.10 OPIATE ABUSE, EPISODIC (H): Primary | ICD-10-CM

## 2024-06-24 NOTE — PROGRESS NOTES
Clinic Care Coordination Contact  Care Team Conversations    Jared came in today to set up an appointment for chiropractor, patient then met with MOUD RN and CHW regarding the services that go with receiving Suboxone for OUD diagnosis. A referral was placed by their PCP for MOUD services to reach out to patient. RN had patient fill out a mental health and addiction release of information for [facility] as they have resources for finances and other resources patient is needing. Patient verbalized understanding and is in agreement with our services. RN will contact patient tomorrow after 2 pm to do screening questions and enroll patient into MOUD services.      aCrol Nur RN on 6/24/2024 at 4:23 PM

## 2024-06-24 NOTE — PROGRESS NOTES
Clinic Care Coordination Contact  Care Team Conversations    Face to face with pt along with DION RN.   Pt was very open and forthcoming with sharing his history and struggles.   Pt moved to [place] from [place] in November which was his last fentanyl use.   He feels he has been doing well over the past six months.  He is also prescribed Valium which has been very helpful in managing his anxiety.  No concerns of misuse with this med.   He is prescribed Trazodone for sleep but doesn't take it on a regular basis as he doesn't like the hangover feeling he gets in the mornings.   Pt stated that he and his mom currently have an apartment together and share expenses. 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  His mom is also a CNA at this facility.   He does not have a 's license due to multiple DUI's in [place] so mom assists with transportation.  Pt stated that his one year goal is to taper off of suboxone as he doesn't like how it makes him feel and also to have the correct medication on board to manage his anxiety.  Discussed long term goal that as he gets to a point of being ready to taper off Suboxone it would make sense to start seeing Psychiatry to assist with other mental health issues.    Pt was in agreement with this plan.   He stated that he also would like to work on getting his drivers license back but knows this will be a long and costly process.   CHW will contact resources to see if there are advocates that can assist with this process.    Pt was able to make a chiropractor appointment.    Pt was given business card for both CHW and RN. Pt isn't scheduled to see Suboxone prescriber until the end of July which is incorrect as he needs to see prescriber end of June.   DION RN will look at this and schedule appropriately.      CHW and RN will continue to follow and support pt as needed.

## 2024-06-24 NOTE — PROGRESS NOTES
Clinic Care Coordination Contact  Care Team Conversations    P/c to pt to check in.   He stated that he was doing well and was currently at work.  Discussed roll of CHW.   He stated that he is currently in need of a chiropractor.  Discussed scheduling at [facility].  He will do this today.    He stated that he has also been exploring therapeutic ketamine in [place].    Discussed insurance barriers with this treatment.   He stated that the reason he was interested is that he seems to have a pattern of relapsing about every six month.   Discussed increased support from CHW and RN to push through this timeline.   He stated that he would be very grateful for this support.    He stated that he thinks his current dose of Suboxone is good 60 - 8 mg films.  He is also prescribed 40 valium pills per month but he runs out the week before his appointment for refill.   He stated that taking Valium gives him an appetite.  When only taking Suboxone he has no appetite and doesn't eat.  So the week before his refill appointment he doesn't eat.    He stated that his prescriber at one time brought up wanting to stop Valium but he expressed concern that this would not be beneficial to his health due to the Valium resulting in him eating.    Pt is planning to come to the clinic after work today and will meet with the CHW and RN face to face.

## 2024-06-25 ENCOUNTER — PATIENT OUTREACH (OUTPATIENT)
Dept: CARE COORDINATION | Facility: CLINIC | Age: 24
End: 2024-06-25
Payer: COMMERCIAL

## 2024-06-25 NOTE — PROGRESS NOTES
Clinic Care Coordination Contact  Care Team Conversations  P/c to pt to let him know that we were able to schedule him with Dr. Larkin tomorrow at 2:20 pm for refills.     Mom is off tomorrow so able to transport.   CHW/RN will touch base with him tomorrow at the appointment.      No

## 2024-06-26 ENCOUNTER — OFFICE VISIT (OUTPATIENT)
Dept: FAMILY MEDICINE | Facility: OTHER | Age: 24
End: 2024-06-26
Attending: FAMILY MEDICINE
Payer: COMMERCIAL

## 2024-06-26 ENCOUNTER — PATIENT OUTREACH (OUTPATIENT)
Dept: FAMILY MEDICINE | Facility: OTHER | Age: 24
End: 2024-06-26
Payer: COMMERCIAL

## 2024-06-26 ENCOUNTER — DOCUMENTATION ONLY (OUTPATIENT)
Dept: CARE COORDINATION | Facility: CLINIC | Age: 24
End: 2024-06-26
Payer: COMMERCIAL

## 2024-06-26 VITALS
RESPIRATION RATE: 20 BRPM | TEMPERATURE: 98.4 F | SYSTOLIC BLOOD PRESSURE: 114 MMHG | OXYGEN SATURATION: 98 % | HEART RATE: 68 BPM | DIASTOLIC BLOOD PRESSURE: 68 MMHG | WEIGHT: 140.4 LBS | BODY MASS INDEX: 21.99 KG/M2

## 2024-06-26 DIAGNOSIS — F11.10 OPIATE ABUSE, EPISODIC (H): ICD-10-CM

## 2024-06-26 PROCEDURE — G2211 COMPLEX E/M VISIT ADD ON: HCPCS | Performed by: FAMILY MEDICINE

## 2024-06-26 PROCEDURE — G0463 HOSPITAL OUTPT CLINIC VISIT: HCPCS

## 2024-06-26 PROCEDURE — 99213 OFFICE O/P EST LOW 20 MIN: CPT | Performed by: FAMILY MEDICINE

## 2024-06-26 RX ORDER — BUPRENORPHINE HYDROCHLORIDE AND NALOXONE HYDROCHLORIDE DIHYDRATE 2; .5 MG/1; MG/1
3 TABLET SUBLINGUAL 2 TIMES DAILY
Qty: 180 TABLET | Refills: 1 | Status: SHIPPED | OUTPATIENT
Start: 2024-06-26 | End: 2024-08-01

## 2024-06-26 RX ORDER — DIAZEPAM 5 MG
5 TABLET ORAL EVERY 12 HOURS PRN
Qty: 55 TABLET | Refills: 1 | Status: SHIPPED | OUTPATIENT
Start: 2024-06-26 | End: 2024-08-01

## 2024-06-26 ASSESSMENT — PAIN SCALES - GENERAL: PAINLEVEL: MILD PAIN (3)

## 2024-06-26 ASSESSMENT — ACTIVITIES OF DAILY LIVING (ADL): DEPENDENT_IADLS:: INDEPENDENT

## 2024-06-26 NOTE — PROGRESS NOTES
Clinic Care Coordination Contact  Clinic Care Coordination Contact  OUTREACH    Referral Information:     PCP put in referral for services.    Chief Complaint   Patient presents with    Clinic Care Coordination - Face To Face        Universal Utilization:   Clinic Utilization  Difficulty keeping appointments:: No  Compliance Concerns: No  No-Show Concerns: No  No PCP office visit in Past Year: No  Utilization      No Show Count (past year)  4             ED Visits  1             Hospital Admissions  0                    Current as of: 6/26/2024 12:40 PM                Clinical Concerns:  Current Medical Concerns:  Patient reports that they lose their appetite when they stop taking their valium     Current Behavioral Concerns: None    Education Provided to patient:   Pain  Pain (GOAL):: No  Health Maintenance Reviewed: Not assessed  Clinical Pathway: None    Medication Management:  Medication review status: Medications reviewed and no changes reported per patient.             Functional Status:  Dependent ADLs:: Independent  Dependent IADLs:: Independent  Bed or wheelchair confined:: No  Mobility Status: Independent  Fallen 2 or more times in the past year?: No  Any fall with injury in the past year?: No    Living Situation:  Current living arrangement:: Other  Type of residence:: Apartment    Lifestyle & Psychosocial Needs:    Social Determinants of Health     Food Insecurity: High Risk (12/28/2023)    Food Insecurity     Within the past 12 months, did you worry that your food would run out before you got money to buy more?: Yes     Within the past 12 months, did the food you bought just not last and you didn t have money to get more?: Patient declined   Depression: Not at risk (3/27/2024)    PHQ-2     PHQ-2 Score: 1   Housing Stability: Low Risk  (12/28/2023)    Housing Stability     Do you have housing? : Yes     Are you worried about losing your housing?: No   Tobacco Use: High Risk (6/26/2024)    Patient History      Smoking Tobacco Use: Some Days     Smokeless Tobacco Use: Never     Passive Exposure: Not on file   Financial Resource Strain: Unknown (12/28/2023)    Financial Resource Strain     Within the past 12 months, have you or your family members you live with been unable to get utilities (heat, electricity) when it was really needed?: Patient declined   Alcohol Use: Not on file   Transportation Needs: Low Risk  (12/28/2023)    Transportation Needs     Within the past 12 months, has lack of transportation kept you from medical appointments, getting your medicines, non-medical meetings or appointments, work, or from getting things that you need?: No   Physical Activity: Not on file   Interpersonal Safety: Low Risk  (5/29/2024)    Interpersonal Safety     Do you feel physically and emotionally safe where you currently live?: Yes     Within the past 12 months, have you been hit, slapped, kicked or otherwise physically hurt by someone?: No     Within the past 12 months, have you been humiliated or emotionally abused in other ways by your partner or ex-partner?: No   Stress: Not on file   Social Connections: Not on file   Health Literacy: Not on file     Diet:: Regular  Inadequate nutrition (GOAL):: No  Tube Feeding: No  Inadequate activity/exercise (GOAL):: No  Significant changes in sleep pattern (GOAL): No  Transportation means:: Family     Restorationist or spiritual beliefs that impact treatment:: No  Mental health DX:: No  Mental health management concern (GOAL):: No  Chemical Dependency Status: Other (see comment) (Pateint is using marijuana rarely)  Chemical Dependency Management: Previous treatment  Informal Support system:: Parent      Resources and Interventions:  Current Resources: Patient was given [employer] to reach out regarding getting their license.      Community Resources: Financial/Insurance  Supplies Currently Used at Home: None  Equipment Currently Used at Home: none  Employment Status: employed  part-time         Advance Care Plan/Directive  Advanced Care Plans/Directives on file:: No    Referrals Placed: None    Care Plan:      Patient/Caregiver understanding: Patient verbalized they will reach out to DION RN and CHW for any extra support.    Outreach Frequency: weekly, more frequently as needed  Future Appointments                Tomorrow Gee Harvey, [facility],  [place] [facility]    In 4 weeks Ortega Larkin MD [facility], [facility]            Plan: Reach out to patient once a week to discuss any concerns with Suboxone or needing more support with staying sober.

## 2024-06-26 NOTE — PROGRESS NOTES
Clinic Care Coordination Contact  Care Team Conversations    Chart review completed by [facility] due to referral by Provider. Pt does meet criteria for MOUD services. MOUD staff will attempt contact with pt.   Provider updated.       AB Walker on 6/26/2024 at 11:00 AM

## 2024-06-26 NOTE — NURSING NOTE
Patient here for refill on his suboxone. Medication Reconciliation: complete.    Ngoc Chen LPN  6/26/2024 2:26 PM

## 2024-06-26 NOTE — PROGRESS NOTES
Assessment & Plan     Opiate abuse, episodic (H)  Patient is interested in decreasing his Suboxone to 6 mg twice a day.  He also indicates that when he runs out of the Valium he does not eat.  Will increase Valium to 50 tablets so a month.  I still encouraged him to work through his anxiety is much as he can.  Also decrease his Suboxone to 6 2 months given.  Follow-up in 2 months  - diazepam (VALIUM) 5 MG tablet; Take 1 tablet (5 mg) by mouth every 12 hours as needed for anxiety or sleep  - buprenorphine-naloxone (SUBOXONE) 2-0.5 MG SUBL sublingual tablet; Place 3 tablets under the tongue 2 times daily              No follow-ups on file.    Subjective   Jared is a 24 year old, presenting for the following health issues:  Recheck Medication         No data to display              Patient arrives here for follow-up Suboxone therapy.  He continues to work.  Continues to be avoid narcotics.  Patient is interested in trying to decrease his Suboxone to 6 mg twice a day from 8.  He does have problems with the sublingual.  States it is running his mouth sores.  And is interested in oral.  Patient is up-to-date on his drug screens.  And narcotic contract    History of Present Illness       Reason for visit:  Follow up    He eats 2-3 servings of fruits and vegetables daily.He consumes 4 sweetened beverage(s) daily.He exercises with enough effort to increase his heart rate 60 or more minutes per day.  He exercises with enough effort to increase his heart rate 6 days per week.   He is taking medications regularly.      reviewed    He is currently taking 16 mg of buprenorphine daily. This is taken in 2 dose(s) daily.     Status Since Last Visit:  Have you used any opioids since your last visit?: no use since last visit  Do you feel that your dose of suboxone is too high or too low? Adequate  Have there been cravings for opioids? No   Any withdrawal symptoms? anxiety  reports experiencing increased anxiety with valium  running low    Any side effects from the medication?  None  Any alcohol use? None  Any other recreational drug use? marijuana    Precipitating Factors:  Triggers have been: mild, doesn't really have triggers  Other Supports:  Do you attend counseling or meet with a therapist? No  Do you attend NA or AA meetings? No feels this would be harder for recovery  Do you have/meet with a sponsor? No  Family and support systems have been: Mom is involved  What other goals have you been working on (job, family, relationships, etc)? Getting license back since lost it in [place]      PDMP Review         Value Time User    State PDMP site checked  Yes 11/7/2023  7:38 PM Kade Tay PA-C                        Objective    /68   Pulse 68   Temp 98.4  F (36.9  C)   Resp 20   Wt 63.7 kg (140 lb 6.4 oz)   SpO2 98%   BMI 21.99 kg/m    Body mass index is 21.99 kg/m .  Physical Exam   GENERAL: alert and no distress  MS: no gross musculoskeletal defects noted, no edema    The longitudinal plan of care for the diagnosis(es)/condition(s) as documented were addressed during this visit. Due to the added complexity in care, I will continue to support Jared in the subsequent management and with ongoing continuity of care.          Signed Electronically by: Ortega Larkin MD

## 2024-06-27 ENCOUNTER — THERAPY VISIT (OUTPATIENT)
Dept: CHIROPRACTIC MEDICINE | Facility: OTHER | Age: 24
End: 2024-06-27
Payer: COMMERCIAL

## 2024-06-27 VITALS — RESPIRATION RATE: 18 BRPM | TEMPERATURE: 97.9 F | OXYGEN SATURATION: 98 % | HEART RATE: 78 BPM

## 2024-06-27 DIAGNOSIS — S13.9XXA NECK SPRAIN, INITIAL ENCOUNTER: ICD-10-CM

## 2024-06-27 DIAGNOSIS — M99.01 SEGMENTAL AND SOMATIC DYSFUNCTION OF CERVICAL REGION: Primary | ICD-10-CM

## 2024-06-27 DIAGNOSIS — M99.02 SEGMENTAL AND SOMATIC DYSFUNCTION OF THORACIC REGION: ICD-10-CM

## 2024-06-27 PROCEDURE — G0463 HOSPITAL OUTPT CLINIC VISIT: HCPCS | Mod: PO

## 2024-06-27 PROCEDURE — 98940 CHIROPRACT MANJ 1-2 REGIONS: CPT | Mod: AT | Performed by: CHIROPRACTOR

## 2024-06-27 PROCEDURE — 99203 OFFICE O/P NEW LOW 30 MIN: CPT | Mod: 25 | Performed by: CHIROPRACTOR
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
## 2024-07-15 ENCOUNTER — PATIENT OUTREACH (OUTPATIENT)
Dept: CARE COORDINATION | Facility: CLINIC | Age: 24
End: 2024-07-15
Payer: COMMERCIAL

## 2024-07-15 NOTE — PROGRESS NOTES
Clinic Care Coordination Contact  Care Team Conversations    Patient called MOUD RN following up regarding Suboxone, and if patient had gone to [employer] regarding getting their license back. Patient verbalized they were able to get the paperwork and states they will fill it out and send it back shortly. Otherwise patient states they are continuing to work, and using their Suboxone. No issues or concerns at this time, patient verbalized will reach out in case anything comes up. RN relayed to patient that RN/CHW will be out till Thursday, patient verbalized understanding.        Carol Nur RN on 7/15/2024 at 2:22 PM

## 2024-07-25 ENCOUNTER — PATIENT OUTREACH (OUTPATIENT)
Dept: CARE COORDINATION | Facility: CLINIC | Age: 24
End: 2024-07-25

## 2024-07-25 NOTE — TELEPHONE ENCOUNTER
Clinic Care Coordination Contact  Care Team Conversations    P/c to pt as he was late for his appointment.   He apologized that he spaced off the appointment and could not attend.  He agreed that he would call in and reschedule the appointment.         AB Walker on 7/25/2024 at 4:26 PM

## 2024-07-26 ENCOUNTER — PATIENT OUTREACH (OUTPATIENT)
Dept: CARE COORDINATION | Facility: CLINIC | Age: 24
End: 2024-07-26
Payer: COMMERCIAL

## 2024-07-26 NOTE — PROGRESS NOTES
Clinic Care Coordination Contact  Care Team Conversations    DION HOLDEN attempted to contact regarding missing their appointment yesterday, no answer, voicemail was left.      Carol Nur RN on 7/26/2024 at 1:29 PM

## 2024-07-30 ENCOUNTER — PATIENT OUTREACH (OUTPATIENT)
Dept: CARE COORDINATION | Facility: CLINIC | Age: 24
End: 2024-07-30
Payer: COMMERCIAL

## 2024-07-30 NOTE — PROGRESS NOTES
Clinic Care Coordination Contact  Care Team Conversations    DION HOLDEN called patient and discussed patient missed appointment last week, patient stated that they lost track of time and forgot about their appointment. RN was able to get patient an appointment for 8/01 @ 3:20 with their PCP regarding Suboxone refill. Patient verbalized they will be there and confirmed appointment time. Patient verbalizes they still have a couple doses of Suboxone left and one valium, states will be able to make it till their appointment.         Carol Nur RN on 7/30/2024 at 3:45 PM

## 2024-08-01 ENCOUNTER — PATIENT OUTREACH (OUTPATIENT)
Dept: CARE COORDINATION | Facility: CLINIC | Age: 24
End: 2024-08-01
Payer: COMMERCIAL

## 2024-08-01 ENCOUNTER — OFFICE VISIT (OUTPATIENT)
Dept: FAMILY MEDICINE | Facility: OTHER | Age: 24
End: 2024-08-01
Attending: FAMILY MEDICINE
Payer: COMMERCIAL

## 2024-08-01 VITALS
DIASTOLIC BLOOD PRESSURE: 68 MMHG | HEART RATE: 78 BPM | OXYGEN SATURATION: 95 % | SYSTOLIC BLOOD PRESSURE: 104 MMHG | RESPIRATION RATE: 20 BRPM | WEIGHT: 146.2 LBS | BODY MASS INDEX: 22.9 KG/M2 | TEMPERATURE: 98.1 F

## 2024-08-01 DIAGNOSIS — F11.10 OPIATE ABUSE, EPISODIC (H): ICD-10-CM

## 2024-08-01 PROCEDURE — 99213 OFFICE O/P EST LOW 20 MIN: CPT | Performed by: FAMILY MEDICINE

## 2024-08-01 PROCEDURE — G2211 COMPLEX E/M VISIT ADD ON: HCPCS | Performed by: FAMILY MEDICINE

## 2024-08-01 PROCEDURE — G0463 HOSPITAL OUTPT CLINIC VISIT: HCPCS

## 2024-08-01 RX ORDER — BUPRENORPHINE HYDROCHLORIDE AND NALOXONE HYDROCHLORIDE DIHYDRATE 2; .5 MG/1; MG/1
3 TABLET SUBLINGUAL 2 TIMES DAILY
Qty: 180 TABLET | Refills: 1 | Status: SHIPPED | OUTPATIENT
Start: 2024-08-01 | End: 2024-10-02

## 2024-08-01 RX ORDER — DIAZEPAM 5 MG
5 TABLET ORAL EVERY 12 HOURS PRN
Qty: 55 TABLET | Refills: 1 | Status: SHIPPED | OUTPATIENT
Start: 2024-08-01 | End: 2024-10-02

## 2024-08-01 ASSESSMENT — PAIN SCALES - GENERAL: PAINLEVEL: NO PAIN (0)

## 2024-08-01 NOTE — NURSING NOTE
Patient here for suboxone refill. Medication Reconciliation: complete.    Ngoc Chen LPN  8/1/2024 3:02 PM

## 2024-08-05 NOTE — PROGRESS NOTES
SUBJECTIVE:   Jared Sanchez is a 24 year old male who presents to clinic today for the following health issues: Medication refill    Patient arrives here for medication refill.  Is in need of Valium and Suboxone.  Tolerating the oral pills well.  No change previous.  Continues to work.  Is tolerating the lower dose of Suboxone.    History of Present Illness       Reason for visit:  Follow up    He eats 2-3 servings of fruits and vegetables daily.He consumes 3 sweetened beverage(s) daily.He exercises with enough effort to increase his heart rate 60 or more minutes per day.  He exercises with enough effort to increase his heart rate 6 days per week.   He is taking medications regularly.        Patient Active Problem List    Diagnosis Date Noted    Opiate abuse, episodic (H) 11/22/2023     Priority: Medium     No past medical history on file.     Review of Systems     OBJECTIVE:     /68   Pulse 78   Temp 98.1  F (36.7  C)   Resp 20   Wt 66.3 kg (146 lb 3.2 oz)   SpO2 95%   BMI 22.90 kg/m    Body mass index is 22.9 kg/m .  Physical Exam  Constitutional:       Appearance: Normal appearance.   Neurological:      Mental Status: He is alert.   Psychiatric:         Mood and Affect: Mood normal.         Thought Content: Thought content normal.         Diagnostic Test Results:  none     ASSESSMENT/PLAN:         (F11.10) Opiate abuse, episodic (H)  Comment:   Plan: diazepam (VALIUM) 5 MG tablet,         buprenorphine-naloxone (SUBOXONE) 2-0.5 MG SUBL        sublingual tablet         reviewed and appropriate.  Medication refilled.  Follow-up in 2 months.    The longitudinal plan of care for the diagnosis(es)/condition(s) as documented were addressed during this visit. Due to the added complexity in care, I will continue to support Jared in the subsequent management and with ongoing continuity of care.    Ortega Larkin MD  [facility] AND [facility]

## 2024-08-15 ENCOUNTER — PATIENT OUTREACH (OUTPATIENT)
Dept: CARE COORDINATION | Facility: CLINIC | Age: 24
End: 2024-08-15
Payer: COMMERCIAL

## 2024-08-15 NOTE — PROGRESS NOTES
Clinic Care Coordination Contact  Care Team Conversations    MOUD RN called patient to confirm appointment on 8/26/24 with PCP for their Valium prescription, patient verbalized will be there. Appointment for 8/22/24 needs to be cancelled since patient will be in [place] from 8/22-8/25. RN reminded patient of [employer] on 8/19/24, patient verbalized they will attempt to be there after work on Monday and states that their hours at work have picked up and are not having to apply for unemployment. If patient is unable to find their [employer] gift certificate patient will call CHW tomorrow to let them know. Patient has no questions or concerns at this time.        Carol Nur RN on 8/15/2024 at 2:28 PM

## 2024-08-22 ENCOUNTER — TELEPHONE (OUTPATIENT)
Dept: FAMILY MEDICINE | Facility: OTHER | Age: 24
End: 2024-08-22
Payer: COMMERCIAL

## 2024-08-22 NOTE — TELEPHONE ENCOUNTER
Where should this patients 08/26/24 appointment be rescheduled to?          Blanche Johnson on 8/22/2024 at 3:37 PM

## 2024-08-23 ENCOUNTER — TELEPHONE (OUTPATIENT)
Dept: FAMILY MEDICINE | Facility: OTHER | Age: 24
End: 2024-08-23
Payer: COMMERCIAL

## 2024-08-26 NOTE — TELEPHONE ENCOUNTER
Chart accessed as care coordinator was seeing if I could see patient 8/27/2024. In review of his chart he has refills for the month of September for suboxone and valium. Has appointment in October with PCP. No need for appointment tomorrow for refills.  MARKUS Jo CNP on 8/26/2024 at 1:29 PM

## 2024-08-26 NOTE — TELEPHONE ENCOUNTER
Clinic Care Coordination Contact  Care Team Conversations    DION HOLDEN attempted to contact patient today regarding needing reschedule their appointment for today with their PCP. Unable to leave a message at this time, will continue to reach out to patient before their appointment today at 3:40.    Carol Nur RN on 8/26/2024 at 10:13 AM

## 2024-08-26 NOTE — TELEPHONE ENCOUNTER
Called and spoke to Patient after verifying last name and date of birth. Patient was relayed that they are able to get their prescription for Suboxone and Valium refill on Wednesday. Patient does need to  the remainder of their Suboxone prescription from their last fill, patient verbalized understanding, no further appointments are needed until 10/02/2024. Patient will reach out with any questions or concerns.      Carol Nur RN on 8/26/2024 at 1:59 PM     Quality 47: Advance Care Plan: Advance Care Planning discussed and documented; advance care plan or surrogate decision maker documented in the medical record. Detail Level: Detailed Quality 130: Documentation Of Current Medications In The Medical Record: Current Medications Documented Quality 226: Preventive Care And Screening: Tobacco Use: Screening And Cessation Intervention: Patient screened for tobacco use and is an ex/non-smoker Quality 431: Preventive Care And Screening: Unhealthy Alcohol Use - Screening: Patient screened for unhealthy alcohol use using a single question and scores less than 2 times per year

## 2024-08-26 NOTE — TELEPHONE ENCOUNTER
Called and spoke to Patient after verifying last name and date of birth. Patient was calling for a reminder of their appointment today. RN verbalized to patient that their appointment needs to be rescheduled due to their PCP being gone this week, patient verbalized they are wanting to see another provider for an updated Urine Drug Screen, before getting the refill of their Valium. Per PCP note, patient doesn't need to have an appointment till 10/02/24 for their Suboxone and Valium. Patient verbalized they need to follow up before two months.    Carol Nur RN on 8/26/2024 at 12:45 PM

## 2024-09-18 ENCOUNTER — PATIENT OUTREACH (OUTPATIENT)
Dept: CARE COORDINATION | Facility: CLINIC | Age: 24
End: 2024-09-18
Payer: COMMERCIAL

## 2024-09-18 NOTE — PROGRESS NOTES
Clinic Care Coordination Contact  Care Team Conversations    DION HOLDEN attempted to contact to check in before their appointment in 2 weeks. Unable to leave a message at this time.      Carol Nur RN on 9/18/2024 at 12:30 PM

## 2024-09-26 ENCOUNTER — DOCUMENTATION ONLY (OUTPATIENT)
Dept: CARE COORDINATION | Facility: CLINIC | Age: 24
End: 2024-09-26
Payer: COMMERCIAL

## 2024-09-26 NOTE — PROGRESS NOTES
Clinic Care Coordination Contact  Care Team Conversations    MOUD RN texted patient to let them know RN has a work cell phone for appointment changes, medication questions or any questions. RN verbalized that the phone stays at work and is not accessed after 4:30 pm. Patient verbalized understanding and stated that they have been decreasing their dose of Suboxone as they are wanting to get off them.       Carol Nur RN on 9/26/2024 at 3:45 PM

## 2024-10-02 ENCOUNTER — OFFICE VISIT (OUTPATIENT)
Dept: FAMILY MEDICINE | Facility: OTHER | Age: 24
End: 2024-10-02
Attending: FAMILY MEDICINE
Payer: COMMERCIAL

## 2024-10-02 VITALS
OXYGEN SATURATION: 97 % | SYSTOLIC BLOOD PRESSURE: 120 MMHG | HEART RATE: 72 BPM | WEIGHT: 144.5 LBS | BODY MASS INDEX: 22.63 KG/M2 | DIASTOLIC BLOOD PRESSURE: 70 MMHG | TEMPERATURE: 98.1 F | RESPIRATION RATE: 16 BRPM

## 2024-10-02 DIAGNOSIS — F11.10 OPIATE ABUSE, EPISODIC (H): Primary | ICD-10-CM

## 2024-10-02 LAB — CREAT UR-MCNC: 209 MG/DL

## 2024-10-02 PROCEDURE — 99214 OFFICE O/P EST MOD 30 MIN: CPT | Performed by: FAMILY MEDICINE

## 2024-10-02 PROCEDURE — G2211 COMPLEX E/M VISIT ADD ON: HCPCS | Performed by: FAMILY MEDICINE

## 2024-10-02 PROCEDURE — G0463 HOSPITAL OUTPT CLINIC VISIT: HCPCS

## 2024-10-02 PROCEDURE — 80365 DRUG SCREENING OXYCODONE: CPT | Mod: ZL | Performed by: FAMILY MEDICINE

## 2024-10-02 PROCEDURE — 80363 OPIOIDS & OPIATE ANALOGS 3/4: CPT | Mod: ZL | Performed by: FAMILY MEDICINE

## 2024-10-02 RX ORDER — BUPRENORPHINE HYDROCHLORIDE AND NALOXONE HYDROCHLORIDE DIHYDRATE 2; .5 MG/1; MG/1
TABLET SUBLINGUAL
Qty: 150 TABLET | Refills: 1 | Status: SHIPPED | OUTPATIENT
Start: 2024-10-02

## 2024-10-02 RX ORDER — DIAZEPAM 5 MG/1
5 TABLET ORAL EVERY 12 HOURS PRN
Qty: 60 TABLET | Refills: 1 | Status: SHIPPED | OUTPATIENT
Start: 2024-10-02

## 2024-10-02 ASSESSMENT — ANXIETY QUESTIONNAIRES
2. NOT BEING ABLE TO STOP OR CONTROL WORRYING: SEVERAL DAYS
1. FEELING NERVOUS, ANXIOUS, OR ON EDGE: SEVERAL DAYS
IF YOU CHECKED OFF ANY PROBLEMS ON THIS QUESTIONNAIRE, HOW DIFFICULT HAVE THESE PROBLEMS MADE IT FOR YOU TO DO YOUR WORK, TAKE CARE OF THINGS AT HOME, OR GET ALONG WITH OTHER PEOPLE: VERY DIFFICULT
7. FEELING AFRAID AS IF SOMETHING AWFUL MIGHT HAPPEN: SEVERAL DAYS
3. WORRYING TOO MUCH ABOUT DIFFERENT THINGS: SEVERAL DAYS
5. BEING SO RESTLESS THAT IT IS HARD TO SIT STILL: SEVERAL DAYS
7. FEELING AFRAID AS IF SOMETHING AWFUL MIGHT HAPPEN: SEVERAL DAYS
GAD7 TOTAL SCORE: 7
6. BECOMING EASILY ANNOYED OR IRRITABLE: SEVERAL DAYS
GAD7 TOTAL SCORE: 7
8. IF YOU CHECKED OFF ANY PROBLEMS, HOW DIFFICULT HAVE THESE MADE IT FOR YOU TO DO YOUR WORK, TAKE CARE OF THINGS AT HOME, OR GET ALONG WITH OTHER PEOPLE?: VERY DIFFICULT
GAD7 TOTAL SCORE: 7
4. TROUBLE RELAXING: SEVERAL DAYS

## 2024-10-02 ASSESSMENT — PAIN SCALES - PAIN ENJOYMENT GENERAL ACTIVITY SCALE (PEG)
INTERFERED_GENERAL_ACTIVITY: 3
PEG_TOTALSCORE: 3
INTERFERED_GENERAL_ACTIVITY: 3
INTERFERED_ENJOYMENT_LIFE: 3
PEG_TOTALSCORE: 3
INTERFERED_ENJOYMENT_LIFE: 3
AVG_PAIN_PASTWEEK: 3
AVG_PAIN_PASTWEEK: 3

## 2024-10-02 ASSESSMENT — PATIENT HEALTH QUESTIONNAIRE - PHQ9
10. IF YOU CHECKED OFF ANY PROBLEMS, HOW DIFFICULT HAVE THESE PROBLEMS MADE IT FOR YOU TO DO YOUR WORK, TAKE CARE OF THINGS AT HOME, OR GET ALONG WITH OTHER PEOPLE: VERY DIFFICULT
SUM OF ALL RESPONSES TO PHQ QUESTIONS 1-9: 8
SUM OF ALL RESPONSES TO PHQ QUESTIONS 1-9: 8

## 2024-10-02 ASSESSMENT — PAIN SCALES - GENERAL: PAINLEVEL: NO PAIN (0)

## 2024-10-02 NOTE — NURSING NOTE
"Chief Complaint   Patient presents with    Recheck Medication     2 month follow up suboxone and diazepam       Initial /70   Pulse 72   Temp 98.1  F (36.7  C) (Tympanic)   Resp 16   Wt 65.5 kg (144 lb 8 oz)   SpO2 97%   BMI 22.63 kg/m   Estimated body mass index is 22.63 kg/m  as calculated from the following:    Height as of 11/22/23: 1.702 m (5' 7\").    Weight as of this encounter: 65.5 kg (144 lb 8 oz).  Medication Review: complete    The next two questions are to help us understand your food security.  If you are feeling you need any assistance in this area, we have resources available to support you today.          12/28/2023   SDOH- Food Insecurity   Within the past 12 months, did you worry that your food would run out before you got money to buy more? Y   Within the past 12 months, did the food you bought just not last and you didn t have money to get more? Pt Declined            Health Care Directive:  Patient does not have a Health Care Directive or Living Will: Discussed advance care planning with patient; however, patient declined at this time.    Norma J. Gosselin, LPN      "

## 2024-10-02 NOTE — PROGRESS NOTES
{PROVIDER CHARTING PREFERENCE:398951}    Subjective   Jared is a 24 year old, presenting for the following health issues:  Recheck Medication (2 month follow up suboxone and diazepam)    History of Present Illness       Reason for visit:  Check up He is missing 7 dose(s) of medications per week.  He is not taking prescribed medications regularly due to other.     {ROS Picklists (Optional):618424}      Objective    /70   Pulse 72   Temp 98.1  F (36.7  C) (Tympanic)   Resp 16   Wt 65.5 kg (144 lb 8 oz)   SpO2 97%   BMI 22.63 kg/m    Body mass index is 22.63 kg/m .  Physical Exam   {Exam List (Optional):340481}    {Diagnostic Test Results (Optional):679071}        Signed Electronically by: Ortega Larkin MD  {Email feedback regarding this note to [email or web address]   :[id number]}

## 2024-10-03 NOTE — PROGRESS NOTES
SUBJECTIVE:   Jared Sanchez is a 24 year old male who presents to clinic today for the following health issues:  Suboxone refill Valium refill  Patient arrives here for Suboxone refill.  He is interested in decreasing his dose by 1.  Taken 3 in the morning 2 in the evening.  Also is interested in increasing his Valium to 3 times a day.  States that he typically takes it prior to meals.  It makes meals go much better.  He is currently on 5 mg twice a day.  States he gets very anxious if he does not take his Valium.  He has trouble eating during this time.  Rundown.  Has not got continues to be off the fentanyl.  He does smoke cannabis    History of Present Illness       Reason for visit:  Check up He is missing 7 dose(s) of medications per week.  He is not taking prescribed medications regularly due to other.        Patient Active Problem List    Diagnosis Date Noted    Opiate abuse, episodic (H) 11/22/2023     Priority: Medium     History reviewed. No pertinent past medical history.   History reviewed. No pertinent surgical history.    Review of Systems     OBJECTIVE:     /70   Pulse 72   Temp 98.1  F (36.7  C) (Tympanic)   Resp 16   Wt 65.5 kg (144 lb 8 oz)   SpO2 97%   BMI 22.63 kg/m    Body mass index is 22.63 kg/m .  Physical Exam  Constitutional:       Appearance: Normal appearance.   Neurological:      Mental Status: He is alert.   Psychiatric:         Mood and Affect: Mood normal.         Thought Content: Thought content normal.         Diagnostic Test Results:  none     ASSESSMENT/PLAN:     Results for orders placed or performed in visit on 10/02/24   Urine Creatinine for Drug Screen Panel     Status: None   Result Value Ref Range    Creatinine Urine for Drug Screen 209 mg/dL   Drug Confirmation Panel Urine with Creat     Status: None (In process)    Narrative    The following orders were created for panel order Drug Confirmation Panel Urine with Creat.  Procedure                                Abnormality         Status                     ---------                               -----------         ------                     Urine Drug Confirmation ...[[id number]]                      In process                 Urine Creatinine for Srikanth...[[id number]]                      Final result                 Please view results for these tests on the individual orders.         (F11.10) Opiate abuse, episodic (H)  (primary encounter diagnosis)  Comment:   Plan: buprenorphine-naloxone (SUBOXONE) 2-0.5 MG SUBL        sublingual tablet, Adult Mental Health          Referral, diazepam (VALIUM) 5 MG         tablet, Drug Confirmation Panel Urine with         Creat          Patient is in long-term patient is interested in long-term use of Valium.  States that he feels that he keeps his life under good control.  Advised before we make that decision we need a mental health consult.  Will decrease his Suboxone by 1 pill per his request.  Drug screen done.  For now keep Valium at 5 mg twice a day.  The longitudinal plan of care for the diagnosis(es)/condition(s) as documented were addressed during this visit. Due to the added complexity in care, I will continue to support Jared in the subsequent management and with ongoing continuity of care.        Ortega Larkin MD  [facility] AND [facility]

## 2024-10-07 LAB
BUPRENORPHINE UR CFM-MCNC: 172 NG/ML
BUPRENORPHINE/CREAT UR: 82 NG/MG {CREAT}
NALOXONE UR CFM-MCNC: 156 NG/ML
NALOXONE: 75 NG/MG {CREAT}
NORBUPRENORPHINE UR CFM-MCNC: 716 NG/ML
NORBUPRENORPHINE/CREAT UR: 343 NG/MG {CREAT}
NORDIAZEPAM UR QL CFM: PRESENT
OXAZEPAM UR QL CFM: PRESENT
TEMAZEPAM UR QL CFM: PRESENT

## 2024-10-16 ENCOUNTER — DOCUMENTATION ONLY (OUTPATIENT)
Dept: CARE COORDINATION | Facility: CLINIC | Age: 24
End: 2024-10-16
Payer: COMMERCIAL

## 2024-10-21 ENCOUNTER — PATIENT OUTREACH (OUTPATIENT)
Dept: CARE COORDINATION | Facility: CLINIC | Age: 24
End: 2024-10-21
Payer: COMMERCIAL

## 2024-10-21 ENCOUNTER — TELEPHONE (OUTPATIENT)
Dept: CARE COORDINATION | Facility: CLINIC | Age: 24
End: 2024-10-21
Payer: COMMERCIAL

## 2024-10-21 NOTE — TELEPHONE ENCOUNTER
OUD RN messaged patient on work cell phone regarding their suboxone decrease, and patient stated was going good. Discussed [employer] again and patient stated they would leave their door unlocked and to drop it off at their address today.      Carol Nur RN on 10/21/2024 at 11:42 AM

## 2024-10-22 NOTE — TELEPHONE ENCOUNTER
Clinic Care Coordination Contact  Care Team Conversations    Delivered [employer] share to pt's home with MOUD RN.       AB Walker on 10/22/2024 at 10:50 AM

## 2024-10-24 ENCOUNTER — OFFICE VISIT (OUTPATIENT)
Dept: PSYCHIATRY | Facility: OTHER | Age: 24
End: 2024-10-24
Attending: NURSE PRACTITIONER
Payer: COMMERCIAL

## 2024-10-24 VITALS
RESPIRATION RATE: 16 BRPM | DIASTOLIC BLOOD PRESSURE: 74 MMHG | WEIGHT: 153.8 LBS | TEMPERATURE: 98.8 F | BODY MASS INDEX: 24.14 KG/M2 | SYSTOLIC BLOOD PRESSURE: 111 MMHG | OXYGEN SATURATION: 97 % | HEART RATE: 80 BPM | HEIGHT: 67 IN

## 2024-10-24 DIAGNOSIS — F13.20 BENZODIAZEPINE DEPENDENCE, CONTINUOUS (H): Primary | ICD-10-CM

## 2024-10-24 PROCEDURE — 99202 OFFICE O/P NEW SF 15 MIN: CPT | Performed by: NURSE PRACTITIONER

## 2024-10-24 PROCEDURE — G2211 COMPLEX E/M VISIT ADD ON: HCPCS | Performed by: NURSE PRACTITIONER

## 2024-10-24 PROCEDURE — G0463 HOSPITAL OUTPT CLINIC VISIT: HCPCS

## 2024-10-24 ASSESSMENT — ANXIETY QUESTIONNAIRES
1. FEELING NERVOUS, ANXIOUS, OR ON EDGE: SEVERAL DAYS
8. IF YOU CHECKED OFF ANY PROBLEMS, HOW DIFFICULT HAVE THESE MADE IT FOR YOU TO DO YOUR WORK, TAKE CARE OF THINGS AT HOME, OR GET ALONG WITH OTHER PEOPLE?: VERY DIFFICULT
7. FEELING AFRAID AS IF SOMETHING AWFUL MIGHT HAPPEN: NOT AT ALL
5. BEING SO RESTLESS THAT IT IS HARD TO SIT STILL: NOT AT ALL
IF YOU CHECKED OFF ANY PROBLEMS ON THIS QUESTIONNAIRE, HOW DIFFICULT HAVE THESE PROBLEMS MADE IT FOR YOU TO DO YOUR WORK, TAKE CARE OF THINGS AT HOME, OR GET ALONG WITH OTHER PEOPLE: VERY DIFFICULT
7. FEELING AFRAID AS IF SOMETHING AWFUL MIGHT HAPPEN: NOT AT ALL
GAD7 TOTAL SCORE: 11
GAD7 TOTAL SCORE: 11
2. NOT BEING ABLE TO STOP OR CONTROL WORRYING: NEARLY EVERY DAY
3. WORRYING TOO MUCH ABOUT DIFFERENT THINGS: NEARLY EVERY DAY
GAD7 TOTAL SCORE: 11
6. BECOMING EASILY ANNOYED OR IRRITABLE: NEARLY EVERY DAY
4. TROUBLE RELAXING: SEVERAL DAYS

## 2024-10-24 ASSESSMENT — PAIN SCALES - GENERAL: PAINLEVEL_OUTOF10: MODERATE PAIN (4)

## 2024-10-24 ASSESSMENT — PATIENT HEALTH QUESTIONNAIRE - PHQ9
SUM OF ALL RESPONSES TO PHQ QUESTIONS 1-9: 3
10. IF YOU CHECKED OFF ANY PROBLEMS, HOW DIFFICULT HAVE THESE PROBLEMS MADE IT FOR YOU TO DO YOUR WORK, TAKE CARE OF THINGS AT HOME, OR GET ALONG WITH OTHER PEOPLE: SOMEWHAT DIFFICULT
SUM OF ALL RESPONSES TO PHQ QUESTIONS 1-9: 3

## 2024-10-24 NOTE — PROGRESS NOTES
"[facility]   HISTORY AND PHYSICAL     APPOINTMENT DATA     Jared Sanchez  Pronouns: MRN# [id number]   Age: 24 year old YOB: 2000     Source of Referral:   Primary Physician: Ortega Larkin        ASSESSMENT & PLAN     Jared Sanchez is a 24 year old single  male who presented to [facility] for psychiatric services and possibly medication management.      Diagnosis Comments   1. Benzodiazepine dependence, continuous (H)          Answers submitted by the patient for this visit:  Patient Health Questionnaire (Submitted on 10/24/2024)  If you checked off any problems, how difficult have these problems made it for you to do your work, take care of things at home, or get along with other people?: Somewhat difficult  PHQ9 TOTAL SCORE: 3  Patient Health Questionnaire (G7) (Submitted on 10/24/2024)  XOCHILT 7 TOTAL SCORE: 11  General Questionnaire (Submitted on 10/24/2024)  Chief Complaint: Chronic problems general questions HPI Form  What is the reason for your visit today? : check up  How many days per week do you miss taking your medication?: 0  Questionnaire about: Chronic problems general questions HPI Form (Submitted on 10/24/2024)  Chief Complaint: Chronic problems general questions HPI Form    Unable to complete full assessment as patient unwilling to consider alternatives to benzodiazepines. Counseled and education on long-term risks of benzodiazepine use and concurrent use of benzos and suboxone. He expressed understanding but repeatedly indicated that he \"will always need to be on something\" and has no desire to stop the Valium. He did indicate he would prefer to be on Xanax, which he has never been prescribed but previously abused by purchasing or receiving from others. Again, educated on the contraindications of long-term benzo use. Encouraged to return should he change his mind regarding more appropriate treatment for symptoms. Denied depression, only " "indicating restlessness, concentration issues, and low self-worth on PHQ-9. Reported anxiety as \"fine,\" indicating that it is the Suboxone causing him to be \"bipolar,\" and stating that it makes him \"an asshole.\" He and his mom fighting because of his reactivity. XOCHILT-7 indicated anxiety, excessive worry that is hard to control, worrying about many things, trouble relaxing, becoming easily irritable.     Medication:   Continue with Dr. Larkin for ongoing medication management    Psychotherapy: none  Labs: no labs  F/U: PRN if he changes his mind regarding treatment options.    The indications, risks, benefits, side effects, contraindications, possible interactions, and alternatives  have been discussed and are understood by the patient. The patient understands the risks of using street drugs or alcohol. The patient understands to call 911, 295 ([facility] Crisis Line) or come to the nearest ED if life threatening or urgent symptoms present.        HISTORY OF PRESENT ILLNESS   Began use of illicit substances, specifically heroin, as a teen. Stated he started using with his dad. Gradually progressed to fentanyl. We did not touch on history much outside of drug use and current state related to substance use. Expressed being in and out of skilled nursing and detention. Newly sober from fentanyl for about a year. Resided in sober living for 10 months. Still using marijuana daily, which he has used since he was 13 and grows in his home. Also growing mushrooms.      Benzo use began 11/7/2023  Modifying factors: none identified  Trigger: Opiate withdrawal and ER visit  Complicating/contributing factors: ongoing use of marijuana, tolerance development, unwilling to consider alternative options       PSYCHIATRIC HISTORY     Past medication trials and treatments include   None   Currently in counseling: No  Past hospitalizations: unknown  Trauma: unknown           REVIEW OF SYSTEMS              Review Of Systems    Skin: negative  Eyes: " "negative  Ears/Nose/Throat: negative  Respiratory: No shortness of breath, dyspnea on exertion, cough, or hemoptysis  Cardiovascular: negative  Gastrointestinal: negative  Musculoskeletal: negative  Neurologic: negative  Psychiatric: As indicated in HPI &/or Assessment  Endocrine: negative       MEDICATIONS   Prior to Admission medications    Medication Sig Start Date End Date Taking? Authorizing Provider   buprenorphine-naloxone (SUBOXONE) 2-0.5 MG SUBL sublingual tablet Take 3 tablets in the am and 2 tablets in the evening 10/2/24  Yes Ortega Larkin MD   diazepam (VALIUM) 5 MG tablet Take 1 tablet (5 mg) by mouth every 12 hours as needed for anxiety or sleep. 10/2/24  Yes Ortega Larkin MD   naloxone (NARCAN) 4 MG/0.1ML nasal spray Spray 1 spray (4 mg) into one nostril alternating nostrils as needed for opioid reversal every 2-3 minutes until assistance arrives 11/22/23  Yes Ortega Larkin MD   traZODone (DESYREL) 100 MG tablet Take 1 tablet (100 mg) by mouth at bedtime  Patient not taking: Reported on 10/24/2024 11/29/23   Ortega Larkin MD     No Known Allergies     SUBSTANCE USE HISTORY     Cannabis daily  H/O heroin and fentanyl use, indicated he used \"everything.\"     SOCIAL HISTORY   Unable to assess       FAMILY HISTORY   Not assessed     PAST MEDICAL HISTORY   History reviewed. No pertinent past medical history.       LABS     Most Recent 3 CBC's:  Recent Labs   Lab Test 11/07/23  1720 10/24/21  1441   WBC 8.2 13.4*   HGB 16.5 15.7   MCV 92 93    367      Most Recent 3 BMP's:  Recent Labs   Lab Test 11/07/23  1720 10/24/21  1441    134   POTASSIUM 4.4 4.7   CHLORIDE 104 97*   CO2 22 28   BUN 24.5* 18   CR 0.85 1.01   ANIONGAP 15 9   BESSIE 9.6 10.0   GLC 99 100     Most Recent 2 LFT's:  Recent Labs   Lab Test 11/07/23  1720   AST 28   ALT 67   ALKPHOS 87   BILITOTAL 0.6     Most Recent INR's and Anticoagulation Dosing History:  Anticoagulation Dose History           No data to " "display              Most Recent 3 Troponin's:No lab results found.  Most Recent Cholesterol Panel:No lab results found.  Most Recent 6 Bacteria Isolates From Any Culture (See EPIC Reports for Culture Details):No lab results found.  Most Recent TSH, T4 and A1c Labs:  Recent Labs   Lab Test 11/07/23  1720   TSH 0.51          MENTAL STATUS EXAM   Vitals: /74 (BP Location: Right arm, Patient Position: Sitting, Cuff Size: Adult Regular)   Pulse 80   Temp 98.8  F (37.1  C) (Temporal)   Resp 16   Ht 1.71 m (5' 7.32\")   Wt 69.8 kg (153 lb 12.8 oz)   SpO2 97%   BMI 23.86 kg/m      Appearance:  awake, alert and adequately groomed  Attitude:  cooperative, very polite and straight forward  Eye Contact:  good  Mood:  good  Affect:  mood congruent  Speech:  clear, coherent  Psychomotor Behavior:  no evidence of tardive dyskinesia, dystonia, or tics  Thought Process:  logical, linear, and goal oriented  Associations:  no loose associations  Thought Content:  no evidence of suicidal ideation or homicidal ideation and no evidence of psychotic thought  Insight:  limited in relationship to sobriety, medication use, and symptoms  Judgment:  limited  Oriented to:  time, person, and place  Attention Span and Concentration:  intact  Recent and Remote Memory:  intact    Suicide Risk Assessment:  No expression of suicidal ideation or intent; however, note that he repeatedly stated if he is unable to obtain what he wants, he will just find an alternative and end up overdosing. Feels he needs a benzo to remain sober.        ATTESTATION    Areas addressed: Benzodiazepine dependence  No independent Historian  No outside data ordered or reviewed on this day  No social determinates of health impacting provider's ability to diagnose or treat.  Moderate risk of complications, morbidity, and/or mortality of patient management decision made at visit, associated with patient's problem, diagnoses, procedures and treatments.    Rafael " Jan, APRN, CNP, PFMHNP    The longitudinal plan of care for the diagnosis(es)/condition(s) as documented were addressed during this visit. Due to the added complexity in care, I will continue to support Jared in the subsequent management and with ongoing continuity of care.     28 minutes spent on the date of the encounter doing chart review, history and exam, documentation and further activities per the note.

## 2024-10-24 NOTE — NURSING NOTE
"Chief Complaint   Patient presents with    Consult     Medication Management         Initial /74 (BP Location: Right arm, Patient Position: Sitting, Cuff Size: Adult Regular)   Pulse 80   Temp 98.8  F (37.1  C) (Temporal)   Resp 16   Ht 1.71 m (5' 7.32\")   Wt 69.8 kg (153 lb 12.8 oz)   SpO2 97%   BMI 23.86 kg/m   Estimated body mass index is 23.86 kg/m  as calculated from the following:    Height as of this encounter: 1.71 m (5' 7.32\").    Weight as of this encounter: 69.8 kg (153 lb 12.8 oz).  Medication Review: complete    The next two questions are to help us understand your food security.  If you are feeling you need any assistance in this area, we have resources available to support you today.          12/28/2023   SDOH- Food Insecurity   Within the past 12 months, did you worry that your food would run out before you got money to buy more? Y    Within the past 12 months, did the food you bought just not last and you didn t have money to get more? Pt Declined        Patient-reported         Health Care Directive:  Patient does not have a Health Care Directive: Patient sent home with a health care directive today.    Vandana Dixon      "

## 2024-11-08 ENCOUNTER — DOCUMENTATION ONLY (OUTPATIENT)
Dept: CARE COORDINATION | Facility: CLINIC | Age: 24
End: 2024-11-08
Payer: COMMERCIAL

## 2024-11-08 NOTE — PROGRESS NOTES
"Clinic Care Coordination Contact  Care Team Conversations    Patient sent message to OUD RN via work phone, regarding receiving multiple phone calls from [place]. RN found that clinic is calling to reschedule their appointment on 11/27/24. OUD RN relayed this to patient and patient verbalized \"yeah that's fine if its after 3\". OUD RN asked patient if wants RN to see when  they want to reschedule patient.       Carol Nur RN on 11/8/2024 at 2:34 PM    "

## 2024-11-14 ENCOUNTER — PATIENT OUTREACH (OUTPATIENT)
Dept: CARE COORDINATION | Facility: CLINIC | Age: 24
End: 2024-11-14
Payer: COMMERCIAL

## 2024-11-14 NOTE — PROGRESS NOTES
Clinic Care Coordination Contact  Care Team Conversations    OUD RN sent patient a message via work cell phone regarding patient's appointment on 11/27/24 being rescheduled. Patient verbalized was able to get rescheduled for 11/26/24 for 3:15 pm and verbalized no other concerns at this time. Patient will reach out to OUD RN and/or CHW with any concerns.      Carol Nur RN on 11/14/2024 at 3:40 PM

## 2024-12-03 ENCOUNTER — OFFICE VISIT (OUTPATIENT)
Dept: FAMILY MEDICINE | Facility: OTHER | Age: 24
End: 2024-12-03
Attending: FAMILY MEDICINE
Payer: COMMERCIAL

## 2024-12-03 ENCOUNTER — PATIENT OUTREACH (OUTPATIENT)
Dept: CARE COORDINATION | Facility: CLINIC | Age: 24
End: 2024-12-03

## 2024-12-03 VITALS
OXYGEN SATURATION: 97 % | DIASTOLIC BLOOD PRESSURE: 80 MMHG | HEART RATE: 76 BPM | TEMPERATURE: 98.2 F | RESPIRATION RATE: 20 BRPM | SYSTOLIC BLOOD PRESSURE: 132 MMHG | WEIGHT: 158 LBS | BODY MASS INDEX: 24.51 KG/M2

## 2024-12-03 DIAGNOSIS — F11.10 OPIATE ABUSE, EPISODIC (H): ICD-10-CM

## 2024-12-03 PROCEDURE — G0463 HOSPITAL OUTPT CLINIC VISIT: HCPCS

## 2024-12-03 RX ORDER — BUPRENORPHINE HYDROCHLORIDE AND NALOXONE HYDROCHLORIDE DIHYDRATE 2; .5 MG/1; MG/1
TABLET SUBLINGUAL
Qty: 150 TABLET | Refills: 1 | Status: CANCELLED | OUTPATIENT
Start: 2024-12-03

## 2024-12-03 RX ORDER — BUPRENORPHINE HYDROCHLORIDE AND NALOXONE HYDROCHLORIDE DIHYDRATE 2; .5 MG/1; MG/1
TABLET SUBLINGUAL
Qty: 150 TABLET | Refills: 1 | Status: SHIPPED | OUTPATIENT
Start: 2024-12-03

## 2024-12-03 RX ORDER — DIAZEPAM 5 MG/1
5 TABLET ORAL EVERY 12 HOURS PRN
Qty: 60 TABLET | Refills: 1 | Status: SHIPPED | OUTPATIENT
Start: 2024-12-03

## 2024-12-03 ASSESSMENT — ANXIETY QUESTIONNAIRES
8. IF YOU CHECKED OFF ANY PROBLEMS, HOW DIFFICULT HAVE THESE MADE IT FOR YOU TO DO YOUR WORK, TAKE CARE OF THINGS AT HOME, OR GET ALONG WITH OTHER PEOPLE?: NOT DIFFICULT AT ALL
3. WORRYING TOO MUCH ABOUT DIFFERENT THINGS: NOT AT ALL
5. BEING SO RESTLESS THAT IT IS HARD TO SIT STILL: NOT AT ALL
GAD7 TOTAL SCORE: 3
2. NOT BEING ABLE TO STOP OR CONTROL WORRYING: NOT AT ALL
4. TROUBLE RELAXING: NOT AT ALL
7. FEELING AFRAID AS IF SOMETHING AWFUL MIGHT HAPPEN: NOT AT ALL
IF YOU CHECKED OFF ANY PROBLEMS ON THIS QUESTIONNAIRE, HOW DIFFICULT HAVE THESE PROBLEMS MADE IT FOR YOU TO DO YOUR WORK, TAKE CARE OF THINGS AT HOME, OR GET ALONG WITH OTHER PEOPLE: NOT DIFFICULT AT ALL
7. FEELING AFRAID AS IF SOMETHING AWFUL MIGHT HAPPEN: NOT AT ALL
1. FEELING NERVOUS, ANXIOUS, OR ON EDGE: SEVERAL DAYS
6. BECOMING EASILY ANNOYED OR IRRITABLE: MORE THAN HALF THE DAYS
GAD7 TOTAL SCORE: 3
GAD7 TOTAL SCORE: 3

## 2024-12-03 ASSESSMENT — PAIN SCALES - GENERAL: PAINLEVEL_OUTOF10: NO PAIN (0)

## 2024-12-03 NOTE — NURSING NOTE
Patient here for medication followup .Medication Reconciliation: complete.    Ngoc Chen LPN  12/3/2024 3:30 PM

## 2024-12-03 NOTE — PROGRESS NOTES
Brendan Coleman is a 24 year old, presenting for the following health issues:  Recheck Medication      He is currently taking 10 mg of buprenorphine daily. This is taken in twice dose(s) daily.     Status Since Last Visit:  Have you used any opioids since your last visit?: no use since last visit  Do you feel that your dose of suboxone is too high or too low? Adequate  Have there been cravings for opioids? No   Any withdrawal symptoms?  None     Any side effects from the medication?  None  Any alcohol use? None  Any other recreational drug use? Marijuana    Precipitating Factors:  Triggers have been: non-existent did have a dream they were using.  Other Supports:  Do you attend counseling or meet with a therapist? Did see Rafael one time- no plans on seeing Rafael again, doesn't feel like this was helpful.  Do you attend NA or AA meetings? No  Do you have/meet with a sponsor? Yes  Family and support systems have been: Mom and occasionally sees dad.  What other goals have you been working on (job, family, relationships, etc)? 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   Patient is wanting to taper off the Suboxone, tends to forget a dose and feels like they are withdrawing, the same as they did coming off Fentanyl. Wants to be off Suboxone by summer. If doesn't take suboxone, feels like they are acting differently, mom states it is hard to be around patient. Wants to stay on the valium for long run, is not wanting to taper off valium.   Carol Nur RN on 12/3/2024 at 3:54 PM

## 2024-12-04 NOTE — PROGRESS NOTES
SUBJECTIVE:   Jared Sanchez is a 24 year old male who presents to clinic today for the following health issues: Follow-up  Patient arrives here for follow-up Suboxone.  And anxiety.  He has seen psychiatry that did not recommend any increase in his Valium.  Patient was seen with nursing staff.  He again reiterates that he thinks that the Suboxone and Valium is controlling his desire to go back on fentanyl.  We did discuss at length about slowly tapering the Valium and his voice became fearful that that would happen.  He indicates that he does not want to get the medication on the street.  Or other providers.  He may be interested in tapering it in the distant future.  But for now he would like to continue the Valium at the same dose.  He is not interested in any other medications.  Fact during her conversation he indicates that he would rather have Xanax but knows he needs to stay away from it.  Continues to work.  He is pursuing a 's license.  History of Present Illness       Mental Health Follow-up:  Patient presents to follow-up on Depression & Anxiety.Patient's depression since last visit has been:  Medium  The patient is not having other symptoms associated with depression.  Patient's anxiety since last visit has been:  Good  The patient is not having other symptoms associated with anxiety.  Any significant life events: No  Patient is not feeling anxious or having panic attacks.  Patient has no concerns about alcohol or drug use.    Reason for visit:  Check up    He eats 2-3 servings of fruits and vegetables daily.He consumes 4 sweetened beverage(s) daily.He exercises with enough effort to increase his heart rate 60 or more minutes per day.  He exercises with enough effort to increase his heart rate 6 days per week.   He is taking medications regularly.        Patient Active Problem List    Diagnosis Date Noted    Opiate abuse, episodic (H) 11/22/2023     Priority: Medium     No past medical history on  Spoke with pt she stated that she never took trulicity    file.   No past surgical history on file.    Review of Systems     OBJECTIVE:     /80   Pulse 76   Temp 98.2  F (36.8  C)   Resp 20   Wt 71.7 kg (158 lb)   SpO2 97%   BMI 24.51 kg/m    Body mass index is 24.51 kg/m .  Physical Exam  Constitutional:       Appearance: Normal appearance.   Neurological:      Mental Status: He is alert.   Psychiatric:         Mood and Affect: Mood normal.         Thought Content: Thought content normal.         Diagnostic Test Results:  none     ASSESSMENT/PLAN:     Appears to be doing very well on his current medications including Valium at    (F11.10) Opiate abuse, episodic (H)  Comment:   Plan: naloxone (NARCAN) 4 MG/0.1ML nasal spray,         diazepam (VALIUM) 5 MG tablet,         buprenorphine-naloxone (SUBOXONE) 2-0.5 MG SUBL        sublingual tablet        Suboxone.  He is given a prescription for Narcan.  Will continue to work with the patient.  Hold hopefully over time his anxiety will decrease.  He is at about 1 year from using any illicit drugs.  Follow-up in 2 months.      Ortega Larkin MD  [facility]

## 2024-12-04 NOTE — PROGRESS NOTES
Clinic Care Coordination Contact  Follow Up Progress Note      Assessment: Patient presented to the clinic today for their appointment follow up with Suboxone and to discuss continuing their Valium. Patient at this time will stay on their current dose of Suboxone and valium with plans to re discuss this in two months at their follow up appointment. Patient was given a $50 aldi card and $20 gas card as well as $20 [employer] card, expressed appreciation for these and verbalized will reach out if needing any other assistance. Has started the first steps for getting their license back, which patient expressed excitement and RN verbalized to patient proud of their hard work. Currently at this time patient has no concerns regarding medications.    Care Gaps:    Health Maintenance Due   Topic Date Due    YEARLY PREVENTIVE VISIT  Never done    Pneumococcal Vaccine: Pediatrics (0 to 5 Years) and At-Risk Patients (6 to 64 Years) (1 of 2 - PCV) Never done    HIV SCREENING  Never done    HPV IMMUNIZATION (1 - Male 3-dose series) Never done    HEPATITIS C SCREENING  Never done    HEPATITIS A IMMUNIZATION (1 of 2 - Risk 2-dose series) Never done    HEPATITIS B IMMUNIZATION (1 of 3 - 19+ 3-dose series) Never done    DTAP/TDAP/TD IMMUNIZATION (1 - Tdap) Never done    INFLUENZA VACCINE (1) Never done    COVID-19 Vaccine (1 - 2024-25 season) Never done    NICOTINE/TOBACCO CESSATION COUNSELING Q 1 YR  11/22/2024       Care Plans  Care Plan: Nutrition       Problem: Diet management       Goal: Demonstrate improved diet management                              Outreach Frequency: monthly, more frequently as needed    Plan:   Patient will continue on their current dose of Suboxone and their valium at this time. Will reassess in two months when patient is scheduled.     Care Coordinator will follow up in a couple weeks, per patients request    Carol Nur RN on 12/4/2024 at 1:30 PM
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
## 2024-12-13 ENCOUNTER — APPOINTMENT (OUTPATIENT)
Dept: CT IMAGING | Facility: OTHER | Age: 24
End: 2024-12-13
Payer: COMMERCIAL

## 2024-12-13 ENCOUNTER — HOSPITAL ENCOUNTER (EMERGENCY)
Facility: OTHER | Age: 24
Discharge: HOME | End: 2024-12-13
Payer: COMMERCIAL

## 2024-12-13 VITALS
OXYGEN SATURATION: 96 % | HEART RATE: 97 BPM | WEIGHT: 160 LBS | TEMPERATURE: 98.1 F | DIASTOLIC BLOOD PRESSURE: 75 MMHG | HEIGHT: 67 IN | SYSTOLIC BLOOD PRESSURE: 143 MMHG | RESPIRATION RATE: 18 BRPM | BODY MASS INDEX: 25.11 KG/M2

## 2024-12-13 DIAGNOSIS — R51.9 FACIAL PAIN: ICD-10-CM

## 2024-12-13 DIAGNOSIS — R51.9 HEADACHE: ICD-10-CM

## 2024-12-13 PROCEDURE — 70450 CT HEAD/BRAIN W/O DYE: CPT

## 2024-12-13 PROCEDURE — 70486 CT MAXILLOFACIAL W/O DYE: CPT

## 2024-12-13 PROCEDURE — 99284 EMERGENCY DEPT VISIT MOD MDM: CPT | Mod: 25

## 2024-12-13 PROCEDURE — 99283 EMERGENCY DEPT VISIT LOW MDM: CPT

## 2024-12-13 ASSESSMENT — ACTIVITIES OF DAILY LIVING (ADL)
ADLS_ACUITY_SCORE: 41

## 2024-12-13 ASSESSMENT — ENCOUNTER SYMPTOMS
HEADACHES: 1
FACIAL SWELLING: 1

## 2024-12-13 ASSESSMENT — COLUMBIA-SUICIDE SEVERITY RATING SCALE - C-SSRS
1. IN THE PAST MONTH, HAVE YOU WISHED YOU WERE DEAD OR WISHED YOU COULD GO TO SLEEP AND NOT WAKE UP?: NO
6. HAVE YOU EVER DONE ANYTHING, STARTED TO DO ANYTHING, OR PREPARED TO DO ANYTHING TO END YOUR LIFE?: NO
2. HAVE YOU ACTUALLY HAD ANY THOUGHTS OF KILLING YOURSELF IN THE PAST MONTH?: NO

## 2024-12-13 ASSESSMENT — VISUAL ACUITY
OU: 1
OD: 20/20
OS: 20/20
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
## 2024-12-14 NOTE — DISCHARGE INSTRUCTIONS
Tylenol and ibuprofen as needed for pain and ice for the swelling follow-up as needed in the clinic for recheck return here if new or worsening concerns or symptoms.

## 2025-01-03 ENCOUNTER — PATIENT OUTREACH (OUTPATIENT)
Dept: CARE COORDINATION | Facility: CLINIC | Age: 25
End: 2025-01-03
Payer: COMMERCIAL

## 2025-01-06 ENCOUNTER — PATIENT OUTREACH (OUTPATIENT)
Dept: CARE COORDINATION | Facility: CLINIC | Age: 25
End: 2025-01-06
Payer: COMMERCIAL

## 2025-01-06 NOTE — PROGRESS NOTES
Clinic Care Coordination Contact  Care Team Conversations    OUD RN sent patient a message to follow up with any questions or concerns they are having and patient had sent back that they were trying to figure out their cell phone situation since their mom had kicked them off their cell phone plan. Is currently still able to use phone but not sure when they will get everything figured out. RN will continue to reach out to patient this week to follow up or if patient is needing any assistance.        Carol Nur RN on 1/6/2025 at 8:13 AM

## 2025-01-08 NOTE — PROGRESS NOTES
Clinic Care Coordination Contact  Care Team Conversations    Patient reached out to OUD RN verbalizing that they would be interested in getting a gas card, an aldi card and [employer] card. RN sent message back that when patient comes to  their prescription to reach out to OUD RN and will meet for the cards. Patient verbalized that they would reach out and did when got to the clinic.        Carol Nur RN on 1/8/2025 at 11:01 AM

## 2025-01-13 ENCOUNTER — PATIENT OUTREACH (OUTPATIENT)
Dept: CARE COORDINATION | Facility: CLINIC | Age: 25
End: 2025-01-13
Payer: COMMERCIAL

## 2025-01-13 NOTE — PROGRESS NOTES
Clinic Care Coordination Contact  Care Team Conversations    Patient called OUD RN stating that they have been without their Subxone for 4 days due to not remembering to pick it up, patient is also unsure if their insurance is active as they had just put in their year to date income. OUD RN verbalized to patient that if it is not able to be covered by insurance that the [facility] would be able to pay for it since patient does need this medication, spoke to pharmacy technician at [facility] pharmacy and stated that it would be $330 dollars for 30 day supply. Note is made in chart regarding payment process, patient has been made aware of this and will be here today to pick this up.      Patient did verbalize experiencing symptoms of withdrawal since it has been 4 days, states experiencing shakes, diarrhea and overall not feeling well. Patient stated they would reach out to OUD RN if having any other concerns before picking up prescription. OUD RN will call [employer] to see if patient does have insurance coverage.      Carol Nur RN on 1/13/2025 at 9:40 AM

## 2025-01-28 ENCOUNTER — PATIENT OUTREACH (OUTPATIENT)
Dept: CARE COORDINATION | Facility: CLINIC | Age: 25
End: 2025-01-28
Payer: COMMERCIAL

## 2025-01-28 NOTE — PROGRESS NOTES
"Clinic Care Coordination Contact  Care Team Conversations    OUD RN sent patient a message on work cell phone reminding of their appointment next Monday. Patient: \"I'm gonna need some help soon tb. I cna't take the alcohol withdrawals and I'm at the point where I've tried slowing down drinking like the past 2 days and I just can't stop my cold sweats and shakes are so bad.\" RN: \"How much were you drinking a day?\" Patient: \"20-15 99 shooters a night\" RN: \"That is a lot to be coming off of, have you been throwing up? I think we should have you go into the emergency room. We can't do much here in the clinic and with you sounding as sick as you are, you should go in. They could give you fluids and they could help with your withdrawal symptoms. RN spoke to patient's PCP and their PCP agreed with RN recommendations to go into the emergency room. Patient: \"No I definitely agree I just don't have the time off of work for it at the moment [facility]. And yes all the above.\" RN: \"I know but we really want you to get seen. If you are drinking this frequently, you shouldn't be taking your valium as it can put you into respiratory distress.\" Patient: \" Maybe that's why I can't hold them down.\" RN: \" Definitely is why, I am going to call you soon. How's your breathing? Any issues? Shortness of breath?\" Patient: \" No, I have no issues just the drinking.\" RN: \" Okay, I know you don't want to miss work, but we really recommend you go in today, alcohol withdrawal is not something to mess around with.\" Patient: \" I have the next 2 days off, and then I have to work 6-2 Friday, Sat and Sunday.\" RN: \"Go to the emergency room after work, if anything just to have someone to monitor you and make sure you are stabilizing.\"     RN called patient today and patient stated that they drank 3-5 shooters since they got home from work and aren't currently experiencing withdrawal symptoms. RN continued to educate patient to not take their Valium while " drinking as this can cause patient to experience respiratory distress and cause more sickness. Patient verbalized they have not been able to take their Valium since they continue to throw up every time. Patient verbalized they would go into the emergency room later tonight. RN will follow up with patient tomorrow.      Carol Nur RN on 1/28/2025 at 3:39 PM

## 2025-02-03 ENCOUNTER — PATIENT OUTREACH (OUTPATIENT)
Dept: CARE COORDINATION | Facility: CLINIC | Age: 25
End: 2025-02-03

## 2025-02-03 ENCOUNTER — OFFICE VISIT (OUTPATIENT)
Dept: FAMILY MEDICINE | Facility: OTHER | Age: 25
End: 2025-02-03
Attending: FAMILY MEDICINE

## 2025-02-03 VITALS
OXYGEN SATURATION: 96 % | SYSTOLIC BLOOD PRESSURE: 110 MMHG | DIASTOLIC BLOOD PRESSURE: 60 MMHG | WEIGHT: 158.8 LBS | TEMPERATURE: 98.3 F | RESPIRATION RATE: 20 BRPM | HEART RATE: 84 BPM | BODY MASS INDEX: 24.87 KG/M2

## 2025-02-03 DIAGNOSIS — F41.9 ANXIETY: Primary | ICD-10-CM

## 2025-02-03 DIAGNOSIS — F11.10 OPIATE ABUSE, EPISODIC (H): ICD-10-CM

## 2025-02-03 DIAGNOSIS — F12.90 MARIJUANA USE: ICD-10-CM

## 2025-02-03 PROCEDURE — G0463 HOSPITAL OUTPT CLINIC VISIT: HCPCS

## 2025-02-03 RX ORDER — BUPRENORPHINE HYDROCHLORIDE AND NALOXONE HYDROCHLORIDE DIHYDRATE 2; .5 MG/1; MG/1
TABLET SUBLINGUAL
Qty: 150 TABLET | Refills: 1 | Status: SHIPPED | OUTPATIENT
Start: 2025-02-03

## 2025-02-03 RX ORDER — DIAZEPAM 5 MG/1
5 TABLET ORAL EVERY 12 HOURS PRN
Qty: 20 TABLET | Refills: 1 | Status: SHIPPED | OUTPATIENT
Start: 2025-02-03

## 2025-02-03 ASSESSMENT — PAIN SCALES - GENERAL: PAINLEVEL_OUTOF10: NO PAIN (0)

## 2025-02-03 NOTE — TELEPHONE ENCOUNTER
Clinic Care Coordination Contact  Care Team Conversations    CHW met with pt with PCP.   Discussed concerns of alcohol use and rx for benzos.  Pt is adamant that when drinking he does not take the benzo.  He also reports having completed his detox and only periodically drinking.   Pt was open to discussing addiction supports needed.  He reports that he continues to have almost daily contact with [facility] and this has been helpful for him.  He is open to therapy and would prefer to see a female therapist.  CHW will look into this for him.   Pt agreed with PCP to return in 15-20 days for another appointment to discuss progress.      AB Walker on 2/3/2025 at 5:26 PM

## 2025-02-03 NOTE — NURSING NOTE
Patient here for medication refill. Medication Reconciliation: complete.    Ngoc Chen LPN  2/3/2025 4:43 PM

## 2025-02-04 ENCOUNTER — PATIENT OUTREACH (OUTPATIENT)
Dept: CARE COORDINATION | Facility: CLINIC | Age: 25
End: 2025-02-04

## 2025-02-04 PROBLEM — F12.90 MARIJUANA USE: Status: ACTIVE | Noted: 2025-02-04

## 2025-02-04 PROBLEM — F41.9 ANXIETY: Status: ACTIVE | Noted: 2025-02-04

## 2025-02-04 NOTE — PROGRESS NOTES
Patient called OUD RN and stated that they had picked up their 20 of valium today and had to pay a copay of $10, patient is unsure if their [employer] is active, stated they have mail at home stating it is. RN will call [employer] tomorrow more to confirm patient's coverage, patient verbalized appreciation.        Carol Nur RN on 2/4/2025 at 4:19 PM     no

## 2025-02-04 NOTE — PROGRESS NOTES
Clinic Care Coordination Contact  Care Team Conversations    OUD RN reached out to patient to schedule their follow up appointment with PCP for Thursday, February 20th at 3 pm, RN discussed unless patient needs to be seen earlier, patient declined and stated February 20th works. RN verbalized to patient stating to reach out if needing anything before appointment. RN and CHW will continue to follow up with patient weekly leading up to their appointment.        Carol Nur RN on 2/4/2025 at 10:34 AM
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
## 2025-02-05 ENCOUNTER — DOCUMENTATION ONLY (OUTPATIENT)
Dept: CARE COORDINATION | Facility: CLINIC | Age: 25
End: 2025-02-05

## 2025-02-05 NOTE — PROGRESS NOTES
Clinic Care Coordination Contact  Care Team Conversations    OUD RN spoke to patient who was concerned that their IMCare was not activated, [facility] on 2/01/2025. Rn relayed information to patient. Patient verbalized when they would be able to  their Suboxone, worried about cost, RN verbalized that when patient comes in tomorrow to  their Suboxone that RN would meet patient at [facility] pharmacy and would put copay amount on the Zecco Jeyson if patient is unable to pay.        Carol Nur RN on 2/5/2025 at 3:02 PM

## 2025-02-19 ENCOUNTER — TELEPHONE (OUTPATIENT)
Dept: CARE COORDINATION | Facility: CLINIC | Age: 25
End: 2025-02-19
Payer: COMMERCIAL

## 2025-02-19 NOTE — TELEPHONE ENCOUNTER
RN sent patient a message reminding patient of their appointment tomorrow, patient verbalized will be showing up and would like to meet with financial advocates afterwards. RN sent message to Natalia Dos Santos to get this set up for patient.      Carol Nur RN on 2/19/2025 at 12:33 PM

## 2025-02-20 ENCOUNTER — PATIENT OUTREACH (OUTPATIENT)
Dept: CARE COORDINATION | Facility: CLINIC | Age: 25
End: 2025-02-20
Payer: COMMERCIAL

## 2025-02-20 NOTE — PROGRESS NOTES
Patient reached out to OUD RN stating that they would be unable to make it to their appointment today due to working later. Rescheduled to Tuesday February 25th a 3:30 pm, patient states that they have enough Suboxone, will be one day short on Valium. States they will be okay till next day. Routing to PCP for review.      Carol Nur RN on 2/20/2025 at 11:51 AM

## 2025-02-25 ENCOUNTER — PATIENT OUTREACH (OUTPATIENT)
Dept: CARE COORDINATION | Facility: CLINIC | Age: 25
End: 2025-02-25

## 2025-02-25 ENCOUNTER — OFFICE VISIT (OUTPATIENT)
Dept: FAMILY MEDICINE | Facility: OTHER | Age: 25
End: 2025-02-25
Attending: FAMILY MEDICINE
Payer: COMMERCIAL

## 2025-02-25 VITALS
SYSTOLIC BLOOD PRESSURE: 104 MMHG | WEIGHT: 159.4 LBS | RESPIRATION RATE: 20 BRPM | BODY MASS INDEX: 24.97 KG/M2 | DIASTOLIC BLOOD PRESSURE: 66 MMHG | OXYGEN SATURATION: 95 % | TEMPERATURE: 97.8 F | HEART RATE: 86 BPM

## 2025-02-25 DIAGNOSIS — F11.10 OPIATE ABUSE, EPISODIC (H): ICD-10-CM

## 2025-02-25 DIAGNOSIS — L20.84 INTRINSIC ECZEMA: Primary | ICD-10-CM

## 2025-02-25 PROCEDURE — G0463 HOSPITAL OUTPT CLINIC VISIT: HCPCS

## 2025-02-25 PROCEDURE — 80307 DRUG TEST PRSMV CHEM ANLYZR: CPT | Mod: ZL | Performed by: FAMILY MEDICINE

## 2025-02-25 RX ORDER — DIAZEPAM 5 MG/1
5 TABLET ORAL EVERY 12 HOURS PRN
Qty: 20 TABLET | Refills: 1 | Status: SHIPPED | OUTPATIENT
Start: 2025-02-25 | End: 2025-02-26

## 2025-02-25 RX ORDER — BUPRENORPHINE HYDROCHLORIDE AND NALOXONE HYDROCHLORIDE DIHYDRATE 2; .5 MG/1; MG/1
TABLET SUBLINGUAL
Qty: 150 TABLET | Refills: 1 | Status: SHIPPED | OUTPATIENT
Start: 2025-02-25

## 2025-02-25 RX ORDER — TRIAMCINOLONE ACETONIDE 1 MG/G
CREAM TOPICAL 2 TIMES DAILY
Qty: 30 G | Refills: 3 | Status: SHIPPED | OUTPATIENT
Start: 2025-02-25

## 2025-02-25 ASSESSMENT — ANXIETY QUESTIONNAIRES
8. IF YOU CHECKED OFF ANY PROBLEMS, HOW DIFFICULT HAVE THESE MADE IT FOR YOU TO DO YOUR WORK, TAKE CARE OF THINGS AT HOME, OR GET ALONG WITH OTHER PEOPLE?: NOT DIFFICULT AT ALL
GAD7 TOTAL SCORE: 0
6. BECOMING EASILY ANNOYED OR IRRITABLE: NOT AT ALL
1. FEELING NERVOUS, ANXIOUS, OR ON EDGE: NOT AT ALL
5. BEING SO RESTLESS THAT IT IS HARD TO SIT STILL: NOT AT ALL
7. FEELING AFRAID AS IF SOMETHING AWFUL MIGHT HAPPEN: NOT AT ALL
2. NOT BEING ABLE TO STOP OR CONTROL WORRYING: NOT AT ALL
IF YOU CHECKED OFF ANY PROBLEMS ON THIS QUESTIONNAIRE, HOW DIFFICULT HAVE THESE PROBLEMS MADE IT FOR YOU TO DO YOUR WORK, TAKE CARE OF THINGS AT HOME, OR GET ALONG WITH OTHER PEOPLE: NOT DIFFICULT AT ALL
GAD7 TOTAL SCORE: 0
4. TROUBLE RELAXING: NOT AT ALL
3. WORRYING TOO MUCH ABOUT DIFFERENT THINGS: NOT AT ALL
GAD7 TOTAL SCORE: 0
7. FEELING AFRAID AS IF SOMETHING AWFUL MIGHT HAPPEN: NOT AT ALL

## 2025-02-25 ASSESSMENT — PATIENT HEALTH QUESTIONNAIRE - PHQ9
SUM OF ALL RESPONSES TO PHQ QUESTIONS 1-9: 10
SUM OF ALL RESPONSES TO PHQ QUESTIONS 1-9: 10
10. IF YOU CHECKED OFF ANY PROBLEMS, HOW DIFFICULT HAVE THESE PROBLEMS MADE IT FOR YOU TO DO YOUR WORK, TAKE CARE OF THINGS AT HOME, OR GET ALONG WITH OTHER PEOPLE: NOT DIFFICULT AT ALL

## 2025-02-25 ASSESSMENT — PAIN SCALES - GENERAL: PAINLEVEL_OUTOF10: NO PAIN (0)
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
## 2025-02-25 NOTE — NURSING NOTE
Patient here for medication follow up and refill. He also has a derm problem on the top of the right foot and top of the right hand. Medication Reconciliation: complete.    Ngoc Chen LPN  2/25/2025 3:41 PM

## 2025-02-26 RX ORDER — DIAZEPAM 5 MG/1
5 TABLET ORAL EVERY 12 HOURS PRN
Qty: 60 TABLET | Refills: 1 | Status: SHIPPED | OUTPATIENT
Start: 2025-02-26

## 2025-02-26 NOTE — PROGRESS NOTES
Patient arrives here for follow-up  SUBJECTIVE:   Jared Sanchez is a 25 year old male who presents to clinic today for the following health issues:    History of Present Illness       Reason for visit:  Follow up   He is taking medications regularly.    To Suboxone.  Patient reports he has not been using alcohol.  Reports taking his medications regularly.  Currently is working extensively.  He would also like his skin looked at above his right foot.  Patient would like to keep the same doses.    Patient Active Problem List    Diagnosis Date Noted    Intrinsic eczema 02/25/2025     Priority: Medium    Anxiety 02/04/2025     Priority: Medium    Marijuana use 02/04/2025     Priority: Medium    Opiate abuse, episodic (H) 11/22/2023     Priority: Medium     No past medical history on file.   No past surgical history on file.    Review of Systems     OBJECTIVE:     /66   Pulse 86   Temp 97.8  F (36.6  C)   Resp 20   Wt 72.3 kg (159 lb 6.4 oz)   SpO2 95%   BMI 24.97 kg/m    Body mass index is 24.97 kg/m .  Physical Exam  Constitutional:       Appearance: Normal appearance.   HENT:      Head: Normocephalic.   Skin:     Comments: Little scaling to the right anterior foot consistent with eczema.   Neurological:      Mental Status: He is alert.   Psychiatric:         Mood and Affect: Mood normal.         Thought Content: Thought content normal.         Diagnostic Test Results:  Results for orders placed or performed in visit on 02/25/25 (from the past 24 hours)   Urine Drug Screen Buprenorphine Urine Qualitative [id number]    Narrative    The following orders were created for panel order Urine Drug Screen Buprenorphine Urine Qualitative [id number].  Procedure                               Abnormality         Status                     ---------                               -----------         ------                     Urine Drug Screen Panel[[id number]]      Abnormal            Final result               Buprenorphine  Urine, Quan...[[id number]]  Abnormal            Final result                 Please view results for these tests on the individual orders.   Urine Drug Screen Panel   Result Value Ref Range    Amphetamines Urine Screen Negative Screen Negative    Barbituates Urine Screen Negative Screen Negative    Benzodiazepine Urine Screen Positive (A) Screen Negative    Cannabinoids Urine Screen Positive (A) Screen Negative    Cocaine Urine Screen Negative Screen Negative    Fentanyl Qual Urine Screen Negative Screen Negative    Opiates Urine Screen Negative Screen Negative    PCP Urine Screen Negative Screen Negative   Buprenorphine Urine, Qualitative   Result Value Ref Range    Buprenorphine Qual Urine Screen Positive (A) Screen Negative       ASSESSMENT/PLAN:         (L20.84) Intrinsic eczema  (primary encounter diagnosis)  Comment: Start  Plan: triamcinolone (KENALOG) 0.1 % external cream            (F11.10) Opiate abuse, episodic (H)  Comment:  reviewed.  Drug screen done which is appropriate.  Follow-up in 2 months.  Patient would like to stay on a 2-month interval for now.  Plan: diazepam (VALIUM) 5 MG tablet,         buprenorphine-naloxone (SUBOXONE) 2-0.5 MG SUBL        sublingual tablet, Urine Drug Screen         Buprenorphine Urine Qualitative [id number]          The longitudinal plan of care for the diagnosis(es)/condition(s) as documented were addressed during this visit. Due to the added complexity in care, I will continue to support Jared in the subsequent management and with ongoing continuity of care.      Ortega Larkin MD  [facility] AND [facility]

## 2025-02-26 NOTE — TELEPHONE ENCOUNTER
Clinic Care Coordination Contact  Care Team Conversations    CHW and DION HOLDEN met with pt at his appointment.  Pt reports doing better.   No alcohol since last appointment.  Discussed possible therapy options.   Pt is open to meet with  with CHW to see if he feels it would be helpful.  CHW will arrange this.  Pt is anxious about possible large copays for his appointment.   Met with SRAVAN Collins after appointment to clarify.   CHW will also look into assisting with copays.      AB Walker on 2/26/2025 at 12:30 PM

## 2025-02-26 NOTE — PROGRESS NOTES
Clinic Care Coordination Contact  Follow Up Progress Note      Assessment: Patient presented today to clinic for Suboxone follow up, states they are taking 3 pills in the morning and 2 pills in the afternoon, however, at times takes less depending on their cravings and how they are feeling throughout the day. 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 CHW discussed this with patient. Continuing to take their valium as needed, no further questions or concerns with medications at this time.     Care Gaps:    Health Maintenance Due   Topic Date Due    YEARLY PREVENTIVE VISIT  Never done    HIV SCREENING  Never done    HPV IMMUNIZATION (1 - Male 3-dose series) Never done    HEPATITIS C SCREENING  Never done    Pneumococcal Vaccine: Pediatrics (0 to 5 Years) and At-Risk Patients (6 to 49 Years) (1 of 2 - PCV) Never done    HEPATITIS A IMMUNIZATION (1 of 2 - Risk 2-dose series) Never done    HEPATITIS B IMMUNIZATION (1 of 3 - 19+ 3-dose series) Never done    DTAP/TDAP/TD IMMUNIZATION (1 - Tdap) Never done    INFLUENZA VACCINE (1) Never done    COVID-19 Vaccine (1 - 2024-25 season) Never done    NICOTINE/TOBACCO CESSATION COUNSELING Q 1 YR  11/22/2024       Care Plans  Care Plan: Nutrition       Problem: Diet management       Goal: Demonstrate improved diet management                             Intervention/Education provided during outreach: Patient wanting to get their GED to be able to work at [employer] regarding their crisis line. CHW gave patient information regarding going about this.      Outreach Frequency: monthly, more frequently as needed    Plan:   Patient will continue taking their Suboxone: 3 pills in the morning and 2 in the afternoon, valium prescription has been updated.    Care Coordinator will follow up in one month with patient.    Carol Nur RN on 2/26/2025 at 12:52 PM

## 2025-03-06 ENCOUNTER — PATIENT OUTREACH (OUTPATIENT)
Dept: CARE COORDINATION | Facility: CLINIC | Age: 25
End: 2025-03-06
Payer: COMMERCIAL

## 2025-03-10 NOTE — PROGRESS NOTES
Clinic Care Coordination Contact  Care Team Conversations    OUD RN sent patient a message via work phone regarding their meeting with financial advocates a couple weeks ago, patient verbalized it went well, ended up finding out they now have a copay for medications and appointments. RN verbalized to have patient reach out with any concerns on paying for copay's on their medications.      Carol Nur RN on 3/10/2025 at 11:23 AM

## 2025-04-07 ENCOUNTER — DOCUMENTATION ONLY (OUTPATIENT)
Dept: CARE COORDINATION | Facility: CLINIC | Age: 25
End: 2025-04-07
Payer: COMMERCIAL

## 2025-04-07 NOTE — PROGRESS NOTES
Clinic Care Coordination Contact  Care Team Conversations    CHW received p/c from pt.    He wanted to relay that he picked up his prescription today and had the cash to pay for his deductible so didn't need assistance with it this month.     Pt reports doing well.      AB Walker on 4/7/2025 at 3:19 PM

## 2025-04-09 ENCOUNTER — PATIENT OUTREACH (OUTPATIENT)
Dept: CARE COORDINATION | Facility: CLINIC | Age: 25
End: 2025-04-09
Payer: COMMERCIAL

## 2025-04-10 NOTE — PROGRESS NOTES
Clinic Care Coordination Contact  Care Team Conversations    OUD RN reached out to patient to regarding needing anything or having an issues or concerns. Patient verbalized that they would appreciate an aldi and gas card as they are currently struggling with groceries. Patient verbalized will stop after work to pick these up.       Carol Nur RN on 4/10/2025 at 9:25 AM

## 2025-04-21 ENCOUNTER — PATIENT OUTREACH (OUTPATIENT)
Dept: CARE COORDINATION | Facility: CLINIC | Age: 25
End: 2025-04-21
Payer: COMMERCIAL

## 2025-04-21 NOTE — PROGRESS NOTES
Clinic Care Coordination Contact  Care Team Conversations    OUD RN reached out to patient to remind them of their upcoming appointment on 4/22/2025 @ 3  pm with provider for Suboxone follow up.        Carol Nur RN on 4/21/2025 at 10:23 AM

## 2025-04-22 ENCOUNTER — OFFICE VISIT (OUTPATIENT)
Dept: FAMILY MEDICINE | Facility: OTHER | Age: 25
End: 2025-04-22
Attending: FAMILY MEDICINE
Payer: COMMERCIAL

## 2025-04-22 ENCOUNTER — PATIENT OUTREACH (OUTPATIENT)
Dept: CARE COORDINATION | Facility: CLINIC | Age: 25
End: 2025-04-22
Payer: COMMERCIAL

## 2025-04-22 VITALS
RESPIRATION RATE: 20 BRPM | BODY MASS INDEX: 23.21 KG/M2 | SYSTOLIC BLOOD PRESSURE: 120 MMHG | OXYGEN SATURATION: 96 % | HEART RATE: 72 BPM | TEMPERATURE: 97.7 F | DIASTOLIC BLOOD PRESSURE: 60 MMHG | WEIGHT: 148.2 LBS

## 2025-04-22 DIAGNOSIS — F11.10 OPIATE ABUSE, EPISODIC (H): ICD-10-CM

## 2025-04-22 PROCEDURE — G0463 HOSPITAL OUTPT CLINIC VISIT: HCPCS

## 2025-04-22 RX ORDER — BUPRENORPHINE HYDROCHLORIDE AND NALOXONE HYDROCHLORIDE DIHYDRATE 2; .5 MG/1; MG/1
TABLET SUBLINGUAL
Qty: 150 TABLET | Refills: 1 | Status: SHIPPED | OUTPATIENT
Start: 2025-04-22

## 2025-04-22 RX ORDER — DIAZEPAM 5 MG/1
5 TABLET ORAL EVERY 12 HOURS PRN
Qty: 60 TABLET | Refills: 1 | Status: SHIPPED | OUTPATIENT
Start: 2025-04-22

## 2025-04-22 ASSESSMENT — ANXIETY QUESTIONNAIRES
2. NOT BEING ABLE TO STOP OR CONTROL WORRYING: NOT AT ALL
GAD7 TOTAL SCORE: 5
1. FEELING NERVOUS, ANXIOUS, OR ON EDGE: NOT AT ALL
4. TROUBLE RELAXING: SEVERAL DAYS
IF YOU CHECKED OFF ANY PROBLEMS ON THIS QUESTIONNAIRE, HOW DIFFICULT HAVE THESE PROBLEMS MADE IT FOR YOU TO DO YOUR WORK, TAKE CARE OF THINGS AT HOME, OR GET ALONG WITH OTHER PEOPLE: SOMEWHAT DIFFICULT
7. FEELING AFRAID AS IF SOMETHING AWFUL MIGHT HAPPEN: SEVERAL DAYS
GAD7 TOTAL SCORE: 5
3. WORRYING TOO MUCH ABOUT DIFFERENT THINGS: NOT AT ALL
6. BECOMING EASILY ANNOYED OR IRRITABLE: MORE THAN HALF THE DAYS
7. FEELING AFRAID AS IF SOMETHING AWFUL MIGHT HAPPEN: SEVERAL DAYS
GAD7 TOTAL SCORE: 5
5. BEING SO RESTLESS THAT IT IS HARD TO SIT STILL: SEVERAL DAYS
8. IF YOU CHECKED OFF ANY PROBLEMS, HOW DIFFICULT HAVE THESE MADE IT FOR YOU TO DO YOUR WORK, TAKE CARE OF THINGS AT HOME, OR GET ALONG WITH OTHER PEOPLE?: SOMEWHAT DIFFICULT

## 2025-04-22 ASSESSMENT — PAIN SCALES - GENERAL: PAINLEVEL_OUTOF10: NO PAIN (0)

## 2025-04-22 NOTE — NURSING NOTE
Patient here for medication refill. Medication Reconciliation: complete.    Ngoc Chen LPN  4/22/2025 3:04 PM

## 2025-04-22 NOTE — TELEPHONE ENCOUNTER
Clinic Care Coordination Contact  Care Team Conversations    CHW met with pt during his scheduled appointment.   Pt is doing well.   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  Work is going okay.  Discussed no funding for [facility].  Pt stated that he was not interested in other therapy options at this time.   Pt is working hard to get his 's license back.   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         AB Walker on 4/22/2025 at 3:51 PM
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
## 2025-06-09 ENCOUNTER — PATIENT OUTREACH (OUTPATIENT)
Dept: CARE COORDINATION | Facility: CLINIC | Age: 25
End: 2025-06-09
Payer: COMMERCIAL

## 2025-06-09 NOTE — PROGRESS NOTES
Clinic Care Coordination Contact  Care Team Conversations    OUD RN reached out to patient today regarding their upcoming appointment on 6/17/2025 and if patient is having any issues with getting their medication refills. Patient verbalized that they will be at the appointment and verbalized that they received a few calls regarding having some payments that need to be made. RN reached out to  who stated that patient has an 84.00 charge for copay's from previous appointments. Patient verbalized wanting to see  before their appointment on 6/17/2025 to get set up for financial assistance. RN verbalized to patient to call [phone] before getting here so  can meet them in the lobby, patient needs to bring their 2024 W2 to visit, patient verbalized understanding and has no other questions at this time.        Carol Nur RN on 6/9/2025 at 3:56 PM

## 2025-06-17 ENCOUNTER — PATIENT OUTREACH (OUTPATIENT)
Dept: CARE COORDINATION | Facility: CLINIC | Age: 25
End: 2025-06-17
Payer: COMMERCIAL

## 2025-06-17 NOTE — PROGRESS NOTES
Clinic Care Coordination Contact  Care Team Conversations    OUD RN reached out to patient to remind them of their upcoming appointment for today at 4 pm. Patient answered back stating that they thought their appointment was on 6/19/2025 and that they wouldn't be able to make it today since they are on their way out of town. OUD RN attempted to call patient to see if they would be able to attend an appointment on Monday June 23rd, patient has not responded yet, will reach out to patient again this afternoon as to get back rescheduled. RN asked patient if had enough Suboxone till Monday. Will update charting once patient responds.        Carol Nur RN on 6/17/2025 at 11:41 AM

## 2025-06-23 ENCOUNTER — OFFICE VISIT (OUTPATIENT)
Dept: FAMILY MEDICINE | Facility: OTHER | Age: 25
End: 2025-06-23
Attending: FAMILY MEDICINE
Payer: COMMERCIAL

## 2025-06-23 VITALS
OXYGEN SATURATION: 96 % | WEIGHT: 139.8 LBS | DIASTOLIC BLOOD PRESSURE: 60 MMHG | HEART RATE: 70 BPM | TEMPERATURE: 97.9 F | SYSTOLIC BLOOD PRESSURE: 90 MMHG | BODY MASS INDEX: 21.9 KG/M2 | RESPIRATION RATE: 20 BRPM

## 2025-06-23 DIAGNOSIS — F11.10 OPIATE ABUSE, EPISODIC (H): Primary | ICD-10-CM

## 2025-06-23 PROCEDURE — 99214 OFFICE O/P EST MOD 30 MIN: CPT | Performed by: FAMILY MEDICINE

## 2025-06-23 PROCEDURE — G0463 HOSPITAL OUTPT CLINIC VISIT: HCPCS

## 2025-06-23 PROCEDURE — G2211 COMPLEX E/M VISIT ADD ON: HCPCS | Performed by: FAMILY MEDICINE

## 2025-06-23 RX ORDER — BUPRENORPHINE HYDROCHLORIDE AND NALOXONE HYDROCHLORIDE DIHYDRATE 2; .5 MG/1; MG/1
TABLET SUBLINGUAL
Qty: 150 TABLET | Refills: 1 | Status: SHIPPED | OUTPATIENT
Start: 2025-06-23

## 2025-06-23 RX ORDER — DIAZEPAM 5 MG/1
5 TABLET ORAL EVERY 12 HOURS PRN
Qty: 60 TABLET | Refills: 1 | Status: SHIPPED | OUTPATIENT
Start: 2025-06-23

## 2025-06-23 ASSESSMENT — PAIN SCALES - GENERAL: PAINLEVEL_OUTOF10: NO PAIN (0)

## 2025-06-23 NOTE — NURSING NOTE
Patient here for suboxone refill. Medication Reconciliation: complete.    Ngoc Chen LPN  6/23/2025 3:45 PM

## 2025-06-24 NOTE — PROGRESS NOTES
SUBJECTIVE:   Jared Sanchez is a 25 year old male who presents to clinic today for the following health issues:    HPI  Patient arrives here for refill of Suboxone.  Currently doing well.  No use of fentanyl.  He does use cannabis quite frequently.  Daily.  He is up-to-date on his drug screen.  PHQ and XOCHILT.  Continues to use Valium on a regular basis that is controlling his anxiety.        Review of Systems     OBJECTIVE:     BP 90/60   Pulse 70   Temp 97.9  F (36.6  C)   Resp 20   Wt 63.4 kg (139 lb 12.8 oz)   SpO2 96%   BMI 21.90 kg/m    Body mass index is 21.9 kg/m .  Physical Exam        ASSESSMENT/PLAN:         (F11.10) Opiate abuse, episodic (H)  (primary encounter diagnosis)  Comment:   Plan: diazepam (VALIUM) 5 MG tablet,         buprenorphine-naloxone (SUBOXONE) 2-0.5 MG SUBL        sublingual tablet          Will continue with the Valium.  Patient is aware of the potential drug interactions between opiates and Valium.  I believe that discontinuing the Valium would pose a significant risk of him restarting his fentanyl and will continue for now.  The longitudinal plan of care for the diagnosis(es)/condition(s) as documented were addressed during this visit. Due to the added complexity in care, I will continue to support Jared in the subsequent management and with ongoing continuity of care.      Ortega Larkin MD  [facility] AND [facility]

## 2025-07-09 ENCOUNTER — PATIENT OUTREACH (OUTPATIENT)
Dept: CARE COORDINATION | Facility: CLINIC | Age: 25
End: 2025-07-09
Payer: COMMERCIAL

## 2025-07-09 NOTE — PROGRESS NOTES
Clinic Care Coordination Contact  Care Team Conversations    OUD RN reached out to patient today regarding their prescription and if they had reached out to financial advocates. Patient verbalized that they were picking up their valium and suboxone prescriptions today and that they had spoken to financial advocates, however, was not sure what else they needed to get done. RN verbalized to have patient follow up with financial advocates soon, so they are not falling behind with payments.        Carol Hanna RN on 7/9/2025 at 2:05 PM

## 2025-08-02 ENCOUNTER — TELEPHONE (OUTPATIENT)
Dept: NURSING | Facility: CLINIC | Age: 25
End: 2025-08-02
Payer: COMMERCIAL

## 2025-08-02 ENCOUNTER — HOSPITAL ENCOUNTER (EMERGENCY)
Facility: OTHER | Age: 25
Discharge: HOME | End: 2025-08-02
Attending: EMERGENCY MEDICINE
Payer: COMMERCIAL

## 2025-08-02 ENCOUNTER — PATIENT OUTREACH (OUTPATIENT)
Dept: CARE COORDINATION | Facility: CLINIC | Age: 25
End: 2025-08-02
Payer: COMMERCIAL

## 2025-08-02 VITALS
TEMPERATURE: 98.1 F | RESPIRATION RATE: 18 BRPM | HEIGHT: 67 IN | BODY MASS INDEX: 22.6 KG/M2 | WEIGHT: 144 LBS | OXYGEN SATURATION: 97 % | DIASTOLIC BLOOD PRESSURE: 85 MMHG | HEART RATE: 99 BPM | SYSTOLIC BLOOD PRESSURE: 123 MMHG

## 2025-08-02 DIAGNOSIS — K64.5 THROMBOSED EXTERNAL HEMORRHOIDS: Primary | ICD-10-CM

## 2025-08-02 PROCEDURE — 250N000011 HC RX IP 250 OP 636: Performed by: EMERGENCY MEDICINE

## 2025-08-02 PROCEDURE — 99207 PR NO CHARGE LOS: CPT | Performed by: EMERGENCY MEDICINE

## 2025-08-02 PROCEDURE — 46083 INC THROMBOSED HROID XTRNL: CPT | Performed by: EMERGENCY MEDICINE

## 2025-08-02 PROCEDURE — 99283 EMERGENCY DEPT VISIT LOW MDM: CPT | Performed by: EMERGENCY MEDICINE

## 2025-08-02 PROCEDURE — 99283 EMERGENCY DEPT VISIT LOW MDM: CPT | Mod: 25 | Performed by: EMERGENCY MEDICINE

## 2025-08-02 RX ORDER — LIDOCAINE HYDROCHLORIDE AND EPINEPHRINE 10; 10 MG/ML; UG/ML
5 INJECTION, SOLUTION INFILTRATION; PERINEURAL ONCE
Status: COMPLETED | OUTPATIENT
Start: 2025-08-02 | End: 2025-08-02

## 2025-08-02 RX ORDER — ACETAMINOPHEN 325 MG/1
975 TABLET ORAL ONCE
Status: COMPLETED | OUTPATIENT
Start: 2025-08-02 | End: 2025-08-02

## 2025-08-02 RX ADMIN — LIDOCAINE HYDROCHLORIDE AND EPINEPHRINE 5 ML: 10; 10 INJECTION, SOLUTION INFILTRATION; PERINEURAL at 06:59

## 2025-08-02 ASSESSMENT — ENCOUNTER SYMPTOMS
FEVER: 0
RECTAL PAIN: 1
DIARRHEA: 0
ANAL BLEEDING: 0
CONSTIPATION: 1

## 2025-08-02 ASSESSMENT — ACTIVITIES OF DAILY LIVING (ADL): ADLS_ACUITY_SCORE: 41

## 2025-08-12 ENCOUNTER — PATIENT OUTREACH (OUTPATIENT)
Dept: CARE COORDINATION | Facility: CLINIC | Age: 25
End: 2025-08-12
Payer: COMMERCIAL

## 2025-08-21 ENCOUNTER — PATIENT OUTREACH (OUTPATIENT)
Dept: CARE COORDINATION | Facility: CLINIC | Age: 25
End: 2025-08-21
Payer: COMMERCIAL

## 2025-08-25 ENCOUNTER — DOCUMENTATION ONLY (OUTPATIENT)
Dept: CARE COORDINATION | Facility: CLINIC | Age: 25
End: 2025-08-25
Payer: COMMERCIAL

## 2025-08-26 ENCOUNTER — OFFICE VISIT (OUTPATIENT)
Dept: FAMILY MEDICINE | Facility: OTHER | Age: 25
End: 2025-08-26
Attending: FAMILY MEDICINE
Payer: COMMERCIAL

## 2025-08-26 ENCOUNTER — PATIENT OUTREACH (OUTPATIENT)
Dept: CARE COORDINATION | Facility: CLINIC | Age: 25
End: 2025-08-26
Payer: COMMERCIAL

## 2025-08-26 VITALS
WEIGHT: 144.8 LBS | HEART RATE: 80 BPM | DIASTOLIC BLOOD PRESSURE: 68 MMHG | TEMPERATURE: 97 F | RESPIRATION RATE: 20 BRPM | BODY MASS INDEX: 22.68 KG/M2 | OXYGEN SATURATION: 95 % | SYSTOLIC BLOOD PRESSURE: 110 MMHG

## 2025-08-26 DIAGNOSIS — F11.10 OPIATE ABUSE, EPISODIC (H): Primary | ICD-10-CM

## 2025-08-26 PROCEDURE — 80307 DRUG TEST PRSMV CHEM ANLYZR: CPT | Mod: ZL | Performed by: FAMILY MEDICINE

## 2025-08-26 PROCEDURE — G0463 HOSPITAL OUTPT CLINIC VISIT: HCPCS | Performed by: FAMILY MEDICINE

## 2025-08-26 RX ORDER — BUPRENORPHINE HYDROCHLORIDE AND NALOXONE HYDROCHLORIDE DIHYDRATE 2; .5 MG/1; MG/1
TABLET SUBLINGUAL
Qty: 150 TABLET | Refills: 2 | Status: SHIPPED | OUTPATIENT
Start: 2025-08-26

## 2025-08-26 RX ORDER — DIAZEPAM 5 MG/1
5 TABLET ORAL EVERY 12 HOURS PRN
Qty: 60 TABLET | Refills: 2 | Status: SHIPPED | OUTPATIENT
Start: 2025-08-26

## 2025-08-26 ASSESSMENT — PAIN SCALES - GENERAL: PAINLEVEL_OUTOF10: NO PAIN (0)

## 2025-08-27 ENCOUNTER — PATIENT OUTREACH (OUTPATIENT)
Dept: CARE COORDINATION | Facility: CLINIC | Age: 25
End: 2025-08-27
Payer: COMMERCIAL

## 2025-08-28 ENCOUNTER — DOCUMENTATION ONLY (OUTPATIENT)
Dept: CARE COORDINATION | Facility: CLINIC | Age: 25
End: 2025-08-28
Payer: COMMERCIAL

## (undated) RX ORDER — DIAZEPAM 5 MG
TABLET ORAL
Status: DISPENSED
Start: 2023-11-07

## (undated) RX ORDER — IBUPROFEN 200 MG
TABLET ORAL
Status: DISPENSED
Start: 2025-08-02

## (undated) RX ORDER — DIAZEPAM 10 MG/2ML
INJECTION, SOLUTION INTRAMUSCULAR; INTRAVENOUS
Status: DISPENSED
Start: 2023-11-07

## (undated) RX ORDER — ACETAMINOPHEN 325 MG/1
TABLET ORAL
Status: DISPENSED
Start: 2025-08-02

## (undated) RX ORDER — LIDOCAINE HYDROCHLORIDE AND EPINEPHRINE 10; 10 MG/ML; UG/ML
INJECTION, SOLUTION INFILTRATION; PERINEURAL
Status: DISPENSED
Start: 2025-08-02